# Patient Record
Sex: MALE | Race: WHITE | Employment: UNEMPLOYED | URBAN - METROPOLITAN AREA
[De-identification: names, ages, dates, MRNs, and addresses within clinical notes are randomized per-mention and may not be internally consistent; named-entity substitution may affect disease eponyms.]

---

## 2020-01-01 ENCOUNTER — APPOINTMENT (OUTPATIENT)
Dept: GENERAL RADIOLOGY | Age: 60
DRG: 720 | End: 2020-01-01
Attending: NURSE PRACTITIONER
Payer: MEDICAID

## 2020-01-01 ENCOUNTER — APPOINTMENT (OUTPATIENT)
Dept: GENERAL RADIOLOGY | Age: 60
DRG: 720 | End: 2020-01-01
Attending: INTERNAL MEDICINE
Payer: MEDICAID

## 2020-01-01 ENCOUNTER — HOSPITAL ENCOUNTER (INPATIENT)
Age: 60
LOS: 10 days | Discharge: HOSPICE | DRG: 720 | End: 2020-09-09
Attending: EMERGENCY MEDICINE | Admitting: INTERNAL MEDICINE
Payer: MEDICAID

## 2020-01-01 ENCOUNTER — HOSPITAL ENCOUNTER (INPATIENT)
Age: 60
LOS: 1 days | DRG: 951 | End: 2020-09-10
Attending: FAMILY MEDICINE | Admitting: FAMILY MEDICINE
Payer: OTHER MISCELLANEOUS

## 2020-01-01 ENCOUNTER — APPOINTMENT (OUTPATIENT)
Dept: GENERAL RADIOLOGY | Age: 60
DRG: 720 | End: 2020-01-01
Attending: EMERGENCY MEDICINE
Payer: MEDICAID

## 2020-01-01 ENCOUNTER — HOSPICE ADMISSION (OUTPATIENT)
Dept: HOSPICE | Facility: HOSPICE | Age: 60
End: 2020-01-01
Payer: MEDICAID

## 2020-01-01 ENCOUNTER — APPOINTMENT (OUTPATIENT)
Dept: NON INVASIVE DIAGNOSTICS | Age: 60
DRG: 720 | End: 2020-01-01
Attending: INTERNAL MEDICINE
Payer: MEDICAID

## 2020-01-01 VITALS
TEMPERATURE: 32 F | OXYGEN SATURATION: 98 % | HEIGHT: 67 IN | DIASTOLIC BLOOD PRESSURE: 70 MMHG | WEIGHT: 173.06 LBS | HEART RATE: 97 BPM | RESPIRATION RATE: 22 BRPM | BODY MASS INDEX: 27.16 KG/M2 | SYSTOLIC BLOOD PRESSURE: 105 MMHG

## 2020-01-01 DIAGNOSIS — R52 GENERALIZED PAIN: ICD-10-CM

## 2020-01-01 DIAGNOSIS — R41.89 UNRESPONSIVE EPISODE: Primary | ICD-10-CM

## 2020-01-01 DIAGNOSIS — R45.1 RESTLESSNESS: ICD-10-CM

## 2020-01-01 DIAGNOSIS — Z51.5 HOSPICE CARE: ICD-10-CM

## 2020-01-01 DIAGNOSIS — R41.89 UNRESPONSIVENESS: ICD-10-CM

## 2020-01-01 DIAGNOSIS — R06.82 TACHYPNEA: ICD-10-CM

## 2020-01-01 DIAGNOSIS — I95.9 HYPOTENSION, UNSPECIFIED HYPOTENSION TYPE: ICD-10-CM

## 2020-01-01 DIAGNOSIS — N17.9 ACUTE RENAL FAILURE, UNSPECIFIED ACUTE RENAL FAILURE TYPE (HCC): ICD-10-CM

## 2020-01-01 DIAGNOSIS — R06.4 LABORED BREATHING: ICD-10-CM

## 2020-01-01 LAB
ALBUMIN SERPL-MCNC: 1.5 G/DL (ref 3.5–5)
ALBUMIN SERPL-MCNC: 1.6 G/DL (ref 3.5–5)
ALBUMIN SERPL-MCNC: 2.1 G/DL (ref 3.5–5)
ALBUMIN SERPL-MCNC: 2.1 G/DL (ref 3.5–5)
ALBUMIN SERPL-MCNC: 2.6 G/DL (ref 3.5–5)
ALBUMIN/GLOB SERPL: 0.4 {RATIO} (ref 1.1–2.2)
ALBUMIN/GLOB SERPL: 0.6 {RATIO} (ref 1.1–2.2)
ALP SERPL-CCNC: 56 U/L (ref 45–117)
ALP SERPL-CCNC: 57 U/L (ref 45–117)
ALP SERPL-CCNC: 59 U/L (ref 45–117)
ALP SERPL-CCNC: 70 U/L (ref 45–117)
ALT SERPL-CCNC: 25 U/L (ref 12–78)
ALT SERPL-CCNC: 27 U/L (ref 12–78)
ALT SERPL-CCNC: 28 U/L (ref 12–78)
ALT SERPL-CCNC: 44 U/L (ref 12–78)
ALT SERPL-CCNC: 58 U/L (ref 12–78)
ALT SERPL-CCNC: 64 U/L (ref 12–78)
ALT SERPL-CCNC: 74 U/L (ref 12–78)
ANION GAP SERPL CALC-SCNC: 3 MMOL/L (ref 5–15)
ANION GAP SERPL CALC-SCNC: 4 MMOL/L (ref 5–15)
ANION GAP SERPL CALC-SCNC: 5 MMOL/L (ref 5–15)
ANION GAP SERPL CALC-SCNC: 5 MMOL/L (ref 5–15)
ANION GAP SERPL CALC-SCNC: 6 MMOL/L (ref 5–15)
ANION GAP SERPL CALC-SCNC: 8 MMOL/L (ref 5–15)
ANION GAP SERPL CALC-SCNC: 9 MMOL/L (ref 5–15)
APPEARANCE UR: CLEAR
ARTERIAL PATENCY WRIST A: ABNORMAL
ARTERIAL PATENCY WRIST A: YES
AST SERPL-CCNC: 30 U/L (ref 15–37)
AST SERPL-CCNC: 46 U/L (ref 15–37)
AST SERPL-CCNC: 70 U/L (ref 15–37)
AST SERPL-CCNC: 85 U/L (ref 15–37)
AST SERPL-CCNC: 90 U/L (ref 15–37)
ATRIAL RATE: 108 BPM
ATRIAL RATE: 139 BPM
ATRIAL RATE: 96 BPM
BACTERIA SPEC CULT: ABNORMAL
BACTERIA SPEC CULT: ABNORMAL
BACTERIA SPEC CULT: NORMAL
BACTERIA SPEC CULT: NORMAL
BACTERIA URNS QL MICRO: NEGATIVE /HPF
BASE DEFICIT BLD-SCNC: 3 MMOL/L
BASE DEFICIT BLD-SCNC: 7 MMOL/L
BASE EXCESS BLD CALC-SCNC: 2 MMOL/L
BASE EXCESS BLD CALC-SCNC: 3 MMOL/L
BASE EXCESS BLD CALC-SCNC: 4 MMOL/L
BASOPHILS # BLD: 0 K/UL (ref 0–0.1)
BASOPHILS # BLD: 0.1 K/UL (ref 0–0.1)
BASOPHILS NFR BLD: 0 % (ref 0–1)
BDY SITE: ABNORMAL
BILIRUB SERPL-MCNC: 0.4 MG/DL (ref 0.2–1)
BILIRUB SERPL-MCNC: 0.5 MG/DL (ref 0.2–1)
BILIRUB SERPL-MCNC: 0.7 MG/DL (ref 0.2–1)
BILIRUB SERPL-MCNC: 0.8 MG/DL (ref 0.2–1)
BILIRUB UR QL: NEGATIVE
BNP SERPL-MCNC: 1567 PG/ML
BNP SERPL-MCNC: 1705 PG/ML
BNP SERPL-MCNC: 1716 PG/ML
BNP SERPL-MCNC: 224 PG/ML
BNP SERPL-MCNC: 281 PG/ML
BNP SERPL-MCNC: 563 PG/ML
BNP SERPL-MCNC: 613 PG/ML
BNP SERPL-MCNC: 917 PG/ML
BUN SERPL-MCNC: 10 MG/DL (ref 6–20)
BUN SERPL-MCNC: 11 MG/DL (ref 6–20)
BUN SERPL-MCNC: 11 MG/DL (ref 6–20)
BUN SERPL-MCNC: 13 MG/DL (ref 6–20)
BUN SERPL-MCNC: 14 MG/DL (ref 6–20)
BUN SERPL-MCNC: 19 MG/DL (ref 6–20)
BUN SERPL-MCNC: 25 MG/DL (ref 6–20)
BUN SERPL-MCNC: 78 MG/DL (ref 6–20)
BUN SERPL-MCNC: 91 MG/DL (ref 6–20)
BUN/CREAT SERPL: 15 (ref 12–20)
BUN/CREAT SERPL: 18 (ref 12–20)
BUN/CREAT SERPL: 19 (ref 12–20)
BUN/CREAT SERPL: 20 (ref 12–20)
BUN/CREAT SERPL: 24 (ref 12–20)
BUN/CREAT SERPL: 28 (ref 12–20)
BUN/CREAT SERPL: 31 (ref 12–20)
CA-I BLD-SCNC: 1.12 MMOL/L (ref 1.12–1.32)
CA-I BLD-SCNC: 1.13 MMOL/L (ref 1.12–1.32)
CA-I BLD-SCNC: 1.13 MMOL/L (ref 1.12–1.32)
CA-I BLD-SCNC: 1.18 MMOL/L (ref 1.12–1.32)
CA-I BLD-SCNC: 1.19 MMOL/L (ref 1.12–1.32)
CA-I BLD-SCNC: 1.23 MMOL/L (ref 1.12–1.32)
CA-I BLD-SCNC: 1.24 MMOL/L (ref 1.12–1.32)
CALCIUM SERPL-MCNC: 7.6 MG/DL (ref 8.5–10.1)
CALCIUM SERPL-MCNC: 7.7 MG/DL (ref 8.5–10.1)
CALCIUM SERPL-MCNC: 7.7 MG/DL (ref 8.5–10.1)
CALCIUM SERPL-MCNC: 7.9 MG/DL (ref 8.5–10.1)
CALCIUM SERPL-MCNC: 8 MG/DL (ref 8.5–10.1)
CALCIUM SERPL-MCNC: 9.4 MG/DL (ref 8.5–10.1)
CALCULATED P AXIS, ECG09: 4 DEGREES
CALCULATED P AXIS, ECG09: 78 DEGREES
CALCULATED R AXIS, ECG10: -73 DEGREES
CALCULATED R AXIS, ECG10: 79 DEGREES
CALCULATED R AXIS, ECG10: 9 DEGREES
CALCULATED T AXIS, ECG11: -121 DEGREES
CALCULATED T AXIS, ECG11: 52 DEGREES
CALCULATED T AXIS, ECG11: 60 DEGREES
CHLORIDE SERPL-SCNC: 105 MMOL/L (ref 97–108)
CHLORIDE SERPL-SCNC: 107 MMOL/L (ref 97–108)
CHLORIDE SERPL-SCNC: 108 MMOL/L (ref 97–108)
CHLORIDE SERPL-SCNC: 111 MMOL/L (ref 97–108)
CHLORIDE SERPL-SCNC: 115 MMOL/L (ref 97–108)
CHLORIDE SERPL-SCNC: 117 MMOL/L (ref 97–108)
CHLORIDE SERPL-SCNC: 117 MMOL/L (ref 97–108)
CHLORIDE SERPL-SCNC: 119 MMOL/L (ref 97–108)
CHLORIDE SERPL-SCNC: 121 MMOL/L (ref 97–108)
CHLORIDE SERPL-SCNC: 122 MMOL/L (ref 97–108)
CHLORIDE SERPL-SCNC: 122 MMOL/L (ref 97–108)
CO2 SERPL-SCNC: 21 MMOL/L (ref 21–32)
CO2 SERPL-SCNC: 24 MMOL/L (ref 21–32)
CO2 SERPL-SCNC: 26 MMOL/L (ref 21–32)
CO2 SERPL-SCNC: 26 MMOL/L (ref 21–32)
CO2 SERPL-SCNC: 27 MMOL/L (ref 21–32)
CO2 SERPL-SCNC: 28 MMOL/L (ref 21–32)
CO2 SERPL-SCNC: 29 MMOL/L (ref 21–32)
CO2 SERPL-SCNC: 29 MMOL/L (ref 21–32)
CO2 SERPL-SCNC: 30 MMOL/L (ref 21–32)
COLOR UR: NORMAL
COMMENT, HOLDF: NORMAL
CREAT SERPL-MCNC: 0.65 MG/DL (ref 0.7–1.3)
CREAT SERPL-MCNC: 0.67 MG/DL (ref 0.7–1.3)
CREAT SERPL-MCNC: 0.68 MG/DL (ref 0.7–1.3)
CREAT SERPL-MCNC: 0.69 MG/DL (ref 0.7–1.3)
CREAT SERPL-MCNC: 0.7 MG/DL (ref 0.7–1.3)
CREAT SERPL-MCNC: 0.71 MG/DL (ref 0.7–1.3)
CREAT SERPL-MCNC: 0.71 MG/DL (ref 0.7–1.3)
CREAT SERPL-MCNC: 0.75 MG/DL (ref 0.7–1.3)
CREAT SERPL-MCNC: 0.81 MG/DL (ref 0.7–1.3)
CREAT SERPL-MCNC: 3.19 MG/DL (ref 0.7–1.3)
CREAT SERPL-MCNC: 4.98 MG/DL (ref 0.7–1.3)
DATE LAST DOSE: ABNORMAL
DIAGNOSIS, 93000: NORMAL
DIFFERENTIAL METHOD BLD: ABNORMAL
ECHO AV PEAK GRADIENT: 8.38 MMHG
ECHO AV PEAK VELOCITY: 144.76 CM/S
ECHO LA MAJOR AXIS: 3.43 CM
ECHO LA MINOR AXIS: 1.86 CM
ECHO LA TO AORTIC ROOT RATIO: 0.68
ECHO LV INTERNAL DIMENSION DIASTOLIC: 4.33 CM (ref 4.2–5.9)
ECHO LV INTERNAL DIMENSION SYSTOLIC: 3.3 CM
ECHO LV IVSD: 1.06 CM (ref 0.6–1)
ECHO LV MASS 2D: 138.1 G (ref 88–224)
ECHO LV MASS INDEX 2D: 74.7 G/M2 (ref 49–115)
ECHO LV POSTERIOR WALL DIASTOLIC: 0.88 CM (ref 0.6–1)
ECHO LVOT PEAK GRADIENT: 6.18 MMHG
ECHO LVOT PEAK VELOCITY: 124.26 CM/S
ECHO MV A VELOCITY: 84.4 CM/S
ECHO MV E VELOCITY: 85.68 CM/S
ECHO MV E/A RATIO: 1.02
ECHO RV TAPSE: 2.54 CM (ref 1.5–2)
ECHO TV REGURGITANT MAX VELOCITY: 282.83 CM/S
ECHO TV REGURGITANT PEAK GRADIENT: 32 MMHG
EOSINOPHIL # BLD: 0 K/UL (ref 0–0.4)
EOSINOPHIL # BLD: 0 K/UL (ref 0–0.4)
EOSINOPHIL # BLD: 0.2 K/UL (ref 0–0.4)
EOSINOPHIL NFR BLD: 0 % (ref 0–7)
EOSINOPHIL NFR BLD: 0 % (ref 0–7)
EOSINOPHIL NFR BLD: 1 % (ref 0–7)
EOSINOPHIL NFR BLD: 2 % (ref 0–7)
EOSINOPHIL NFR BLD: 2 % (ref 0–7)
EPITH CASTS URNS QL MICRO: NORMAL /LPF
ERYTHROCYTE [DISTWIDTH] IN BLOOD BY AUTOMATED COUNT: 14.6 % (ref 11.5–14.5)
ERYTHROCYTE [DISTWIDTH] IN BLOOD BY AUTOMATED COUNT: 14.7 % (ref 11.5–14.5)
ERYTHROCYTE [DISTWIDTH] IN BLOOD BY AUTOMATED COUNT: 14.9 % (ref 11.5–14.5)
ERYTHROCYTE [DISTWIDTH] IN BLOOD BY AUTOMATED COUNT: 15 % (ref 11.5–14.5)
ERYTHROCYTE [DISTWIDTH] IN BLOOD BY AUTOMATED COUNT: 15.1 % (ref 11.5–14.5)
ERYTHROCYTE [DISTWIDTH] IN BLOOD BY AUTOMATED COUNT: 15.3 % (ref 11.5–14.5)
ERYTHROCYTE [DISTWIDTH] IN BLOOD BY AUTOMATED COUNT: 15.4 % (ref 11.5–14.5)
ERYTHROCYTE [DISTWIDTH] IN BLOOD BY AUTOMATED COUNT: 15.5 % (ref 11.5–14.5)
ERYTHROCYTE [DISTWIDTH] IN BLOOD BY AUTOMATED COUNT: 15.8 % (ref 11.5–14.5)
GAS FLOW.O2 O2 DELIVERY SYS: ABNORMAL L/MIN
GAS FLOW.O2 SETTING OXYMISER: 14 BPM
GAS FLOW.O2 SETTING OXYMISER: 16 BPM
GAS FLOW.O2 SETTING OXYMISER: 16 BPM
GAS FLOW.O2 SETTING OXYMISER: 18 BPM
GAS FLOW.O2 SETTING OXYMISER: 20 BPM
GLOBULIN SER CALC-MCNC: 3.6 G/DL (ref 2–4)
GLOBULIN SER CALC-MCNC: 3.6 G/DL (ref 2–4)
GLOBULIN SER CALC-MCNC: 3.7 G/DL (ref 2–4)
GLOBULIN SER CALC-MCNC: 3.8 G/DL (ref 2–4)
GLOBULIN SER CALC-MCNC: 4 G/DL (ref 2–4)
GLOBULIN SER CALC-MCNC: 4.2 G/DL (ref 2–4)
GLOBULIN SER CALC-MCNC: 4.7 G/DL (ref 2–4)
GLUCOSE BLD STRIP.AUTO-MCNC: 100 MG/DL (ref 65–100)
GLUCOSE BLD STRIP.AUTO-MCNC: 100 MG/DL (ref 65–100)
GLUCOSE BLD STRIP.AUTO-MCNC: 101 MG/DL (ref 65–100)
GLUCOSE BLD STRIP.AUTO-MCNC: 102 MG/DL (ref 65–100)
GLUCOSE BLD STRIP.AUTO-MCNC: 102 MG/DL (ref 65–100)
GLUCOSE BLD STRIP.AUTO-MCNC: 104 MG/DL (ref 65–100)
GLUCOSE BLD STRIP.AUTO-MCNC: 105 MG/DL (ref 65–100)
GLUCOSE BLD STRIP.AUTO-MCNC: 106 MG/DL (ref 65–100)
GLUCOSE BLD STRIP.AUTO-MCNC: 108 MG/DL (ref 65–100)
GLUCOSE BLD STRIP.AUTO-MCNC: 110 MG/DL (ref 65–100)
GLUCOSE BLD STRIP.AUTO-MCNC: 112 MG/DL (ref 65–100)
GLUCOSE BLD STRIP.AUTO-MCNC: 116 MG/DL (ref 65–100)
GLUCOSE BLD STRIP.AUTO-MCNC: 135 MG/DL (ref 65–100)
GLUCOSE BLD STRIP.AUTO-MCNC: 141 MG/DL (ref 65–100)
GLUCOSE BLD STRIP.AUTO-MCNC: 77 MG/DL (ref 65–100)
GLUCOSE BLD STRIP.AUTO-MCNC: 79 MG/DL (ref 65–100)
GLUCOSE BLD STRIP.AUTO-MCNC: 83 MG/DL (ref 65–100)
GLUCOSE BLD STRIP.AUTO-MCNC: 85 MG/DL (ref 65–100)
GLUCOSE BLD STRIP.AUTO-MCNC: 86 MG/DL (ref 65–100)
GLUCOSE BLD STRIP.AUTO-MCNC: 88 MG/DL (ref 65–100)
GLUCOSE BLD STRIP.AUTO-MCNC: 88 MG/DL (ref 65–100)
GLUCOSE BLD STRIP.AUTO-MCNC: 90 MG/DL (ref 65–100)
GLUCOSE BLD STRIP.AUTO-MCNC: 93 MG/DL (ref 65–100)
GLUCOSE BLD STRIP.AUTO-MCNC: 94 MG/DL (ref 65–100)
GLUCOSE BLD STRIP.AUTO-MCNC: 94 MG/DL (ref 65–100)
GLUCOSE BLD STRIP.AUTO-MCNC: 95 MG/DL (ref 65–100)
GLUCOSE BLD STRIP.AUTO-MCNC: 96 MG/DL (ref 65–100)
GLUCOSE BLD STRIP.AUTO-MCNC: 97 MG/DL (ref 65–100)
GLUCOSE BLD STRIP.AUTO-MCNC: 99 MG/DL (ref 65–100)
GLUCOSE SERPL-MCNC: 100 MG/DL (ref 65–100)
GLUCOSE SERPL-MCNC: 104 MG/DL (ref 65–100)
GLUCOSE SERPL-MCNC: 105 MG/DL (ref 65–100)
GLUCOSE SERPL-MCNC: 106 MG/DL (ref 65–100)
GLUCOSE SERPL-MCNC: 108 MG/DL (ref 65–100)
GLUCOSE SERPL-MCNC: 109 MG/DL (ref 65–100)
GLUCOSE SERPL-MCNC: 116 MG/DL (ref 65–100)
GLUCOSE SERPL-MCNC: 117 MG/DL (ref 65–100)
GLUCOSE SERPL-MCNC: 147 MG/DL (ref 65–100)
GLUCOSE SERPL-MCNC: 83 MG/DL (ref 65–100)
GLUCOSE SERPL-MCNC: 87 MG/DL (ref 65–100)
GLUCOSE UR STRIP.AUTO-MCNC: NEGATIVE MG/DL
GRAM STN SPEC: ABNORMAL
HCO3 BLD-SCNC: 19.3 MMOL/L (ref 22–26)
HCO3 BLD-SCNC: 22.6 MMOL/L (ref 22–26)
HCO3 BLD-SCNC: 26.8 MMOL/L (ref 22–26)
HCO3 BLD-SCNC: 27.1 MMOL/L (ref 22–26)
HCO3 BLD-SCNC: 27.2 MMOL/L (ref 22–26)
HCO3 BLD-SCNC: 28 MMOL/L (ref 22–26)
HCO3 BLD-SCNC: 28.2 MMOL/L (ref 22–26)
HCT VFR BLD AUTO: 30.4 % (ref 36.6–50.3)
HCT VFR BLD AUTO: 32.3 % (ref 36.6–50.3)
HCT VFR BLD AUTO: 32.4 % (ref 36.6–50.3)
HCT VFR BLD AUTO: 33.5 % (ref 36.6–50.3)
HCT VFR BLD AUTO: 33.6 % (ref 36.6–50.3)
HCT VFR BLD AUTO: 33.7 % (ref 36.6–50.3)
HCT VFR BLD AUTO: 37.5 % (ref 36.6–50.3)
HCT VFR BLD AUTO: 40.7 % (ref 36.6–50.3)
HCT VFR BLD AUTO: 46.9 % (ref 36.6–50.3)
HEALTH STATUS, XMCV2T: NORMAL
HEALTH STATUS, XMCV2T: NORMAL
HGB BLD-MCNC: 10.3 G/DL (ref 12.1–17)
HGB BLD-MCNC: 10.7 G/DL (ref 12.1–17)
HGB BLD-MCNC: 10.8 G/DL (ref 12.1–17)
HGB BLD-MCNC: 10.8 G/DL (ref 12.1–17)
HGB BLD-MCNC: 11 G/DL (ref 12.1–17)
HGB BLD-MCNC: 12.2 G/DL (ref 12.1–17)
HGB BLD-MCNC: 13.5 G/DL (ref 12.1–17)
HGB BLD-MCNC: 15.3 G/DL (ref 12.1–17)
HGB BLD-MCNC: 9.8 G/DL (ref 12.1–17)
HGB UR QL STRIP: NEGATIVE
IMM GRANULOCYTES # BLD AUTO: 0 K/UL
IMM GRANULOCYTES # BLD AUTO: 0.1 K/UL (ref 0–0.04)
IMM GRANULOCYTES # BLD AUTO: 0.2 K/UL (ref 0–0.04)
IMM GRANULOCYTES # BLD AUTO: 0.2 K/UL (ref 0–0.04)
IMM GRANULOCYTES # BLD AUTO: 0.4 K/UL (ref 0–0.04)
IMM GRANULOCYTES NFR BLD AUTO: 0 %
IMM GRANULOCYTES NFR BLD AUTO: 1 % (ref 0–0.5)
IMM GRANULOCYTES NFR BLD AUTO: 2 % (ref 0–0.5)
INR PPP: 1 (ref 0.9–1.1)
INR PPP: 1.1 (ref 0.9–1.1)
INSPIRATION.DURATION SETTING TIME VENT: 1.72 SEC
KETONES UR QL STRIP.AUTO: NEGATIVE MG/DL
LACTATE SERPL-SCNC: 1.8 MMOL/L (ref 0.4–2)
LEUKOCYTE ESTERASE UR QL STRIP.AUTO: NEGATIVE
LYMPHOCYTES # BLD: 1 K/UL (ref 0.8–3.5)
LYMPHOCYTES # BLD: 1 K/UL (ref 0.8–3.5)
LYMPHOCYTES # BLD: 1.2 K/UL (ref 0.8–3.5)
LYMPHOCYTES # BLD: 1.3 K/UL (ref 0.8–3.5)
LYMPHOCYTES # BLD: 1.4 K/UL (ref 0.8–3.5)
LYMPHOCYTES # BLD: 1.4 K/UL (ref 0.8–3.5)
LYMPHOCYTES # BLD: 1.8 K/UL (ref 0.8–3.5)
LYMPHOCYTES NFR BLD: 13 % (ref 12–49)
LYMPHOCYTES NFR BLD: 18 % (ref 12–49)
LYMPHOCYTES NFR BLD: 5 % (ref 12–49)
LYMPHOCYTES NFR BLD: 7 % (ref 12–49)
LYMPHOCYTES NFR BLD: 9 % (ref 12–49)
MAGNESIUM SERPL-MCNC: 1.9 MG/DL (ref 1.6–2.4)
MAGNESIUM SERPL-MCNC: 2 MG/DL (ref 1.6–2.4)
MAGNESIUM SERPL-MCNC: 2 MG/DL (ref 1.6–2.4)
MAGNESIUM SERPL-MCNC: 2.1 MG/DL (ref 1.6–2.4)
MAGNESIUM SERPL-MCNC: 2.1 MG/DL (ref 1.6–2.4)
MAGNESIUM SERPL-MCNC: 2.2 MG/DL (ref 1.6–2.4)
MAGNESIUM SERPL-MCNC: 2.2 MG/DL (ref 1.6–2.4)
MAGNESIUM SERPL-MCNC: 2.6 MG/DL (ref 1.6–2.4)
MCH RBC QN AUTO: 29.9 PG (ref 26–34)
MCH RBC QN AUTO: 30.1 PG (ref 26–34)
MCH RBC QN AUTO: 30.4 PG (ref 26–34)
MCH RBC QN AUTO: 30.5 PG (ref 26–34)
MCH RBC QN AUTO: 30.6 PG (ref 26–34)
MCH RBC QN AUTO: 30.7 PG (ref 26–34)
MCH RBC QN AUTO: 30.8 PG (ref 26–34)
MCH RBC QN AUTO: 30.9 PG (ref 26–34)
MCH RBC QN AUTO: 31.1 PG (ref 26–34)
MCHC RBC AUTO-ENTMCNC: 31.9 G/DL (ref 30–36.5)
MCHC RBC AUTO-ENTMCNC: 31.9 G/DL (ref 30–36.5)
MCHC RBC AUTO-ENTMCNC: 32.1 G/DL (ref 30–36.5)
MCHC RBC AUTO-ENTMCNC: 32.2 G/DL (ref 30–36.5)
MCHC RBC AUTO-ENTMCNC: 32.5 G/DL (ref 30–36.5)
MCHC RBC AUTO-ENTMCNC: 32.6 G/DL (ref 30–36.5)
MCHC RBC AUTO-ENTMCNC: 32.6 G/DL (ref 30–36.5)
MCHC RBC AUTO-ENTMCNC: 33.2 G/DL (ref 30–36.5)
MCHC RBC AUTO-ENTMCNC: 33.3 G/DL (ref 30–36.5)
MCV RBC AUTO: 91.5 FL (ref 80–99)
MCV RBC AUTO: 92.3 FL (ref 80–99)
MCV RBC AUTO: 92.7 FL (ref 80–99)
MCV RBC AUTO: 93.1 FL (ref 80–99)
MCV RBC AUTO: 93.9 FL (ref 80–99)
MCV RBC AUTO: 95 FL (ref 80–99)
MCV RBC AUTO: 95.7 FL (ref 80–99)
MCV RBC AUTO: 96 FL (ref 80–99)
MCV RBC AUTO: 96 FL (ref 80–99)
MONOCYTES # BLD: 0.8 K/UL (ref 0–1)
MONOCYTES # BLD: 0.8 K/UL (ref 0–1)
MONOCYTES # BLD: 0.9 K/UL (ref 0–1)
MONOCYTES # BLD: 1.1 K/UL (ref 0–1)
MONOCYTES # BLD: 1.5 K/UL (ref 0–1)
MONOCYTES # BLD: 1.6 K/UL (ref 0–1)
MONOCYTES # BLD: 2.1 K/UL (ref 0–1)
MONOCYTES NFR BLD: 5 % (ref 5–13)
MONOCYTES NFR BLD: 6 % (ref 5–13)
MONOCYTES NFR BLD: 7 % (ref 5–13)
MONOCYTES NFR BLD: 7 % (ref 5–13)
MONOCYTES NFR BLD: 8 % (ref 5–13)
MONOCYTES NFR BLD: 8 % (ref 5–13)
MONOCYTES NFR BLD: 9 % (ref 5–13)
NEUTS BAND NFR BLD MANUAL: 2 % (ref 0–6)
NEUTS SEG # BLD: 13.1 K/UL (ref 1.8–8)
NEUTS SEG # BLD: 17.7 K/UL (ref 1.8–8)
NEUTS SEG # BLD: 18.8 K/UL (ref 1.8–8)
NEUTS SEG # BLD: 19.2 K/UL (ref 1.8–8)
NEUTS SEG # BLD: 19.8 K/UL (ref 1.8–8)
NEUTS SEG # BLD: 6.8 K/UL (ref 1.8–8)
NEUTS SEG # BLD: 7.8 K/UL (ref 1.8–8)
NEUTS SEG NFR BLD: 70 % (ref 32–75)
NEUTS SEG NFR BLD: 76 % (ref 32–75)
NEUTS SEG NFR BLD: 82 % (ref 32–75)
NEUTS SEG NFR BLD: 83 % (ref 32–75)
NEUTS SEG NFR BLD: 85 % (ref 32–75)
NEUTS SEG NFR BLD: 87 % (ref 32–75)
NEUTS SEG NFR BLD: 87 % (ref 32–75)
NITRITE UR QL STRIP.AUTO: NEGATIVE
NRBC # BLD: 0 K/UL (ref 0–0.01)
NRBC BLD-RTO: 0 PER 100 WBC
O2/TOTAL GAS SETTING VFR VENT: 100 %
O2/TOTAL GAS SETTING VFR VENT: 24 %
O2/TOTAL GAS SETTING VFR VENT: 30 %
O2/TOTAL GAS SETTING VFR VENT: 50 %
O2/TOTAL GAS SETTING VFR VENT: 70 %
P-R INTERVAL, ECG05: 126 MS
P-R INTERVAL, ECG05: 142 MS
P-R INTERVAL, ECG05: 148 MS
PCO2 BLD: 37.5 MMHG (ref 35–45)
PCO2 BLD: 39.2 MMHG (ref 35–45)
PCO2 BLD: 39.5 MMHG (ref 35–45)
PCO2 BLD: 42.9 MMHG (ref 35–45)
PCO2 BLD: 44.6 MMHG (ref 35–45)
PCO2 BLD: 44.7 MMHG (ref 35–45)
PCO2 BLD: 46.2 MMHG (ref 35–45)
PEEP RESPIRATORY: 10 CMH2O
PEEP RESPIRATORY: 10 CMH2O
PEEP RESPIRATORY: 5 CMH2O
PEEP RESPIRATORY: 8 CMH2O
PH BLD: 7.32 [PH] (ref 7.35–7.45)
PH BLD: 7.33 [PH] (ref 7.35–7.45)
PH BLD: 7.38 [PH] (ref 7.35–7.45)
PH BLD: 7.41 [PH] (ref 7.35–7.45)
PH BLD: 7.41 [PH] (ref 7.35–7.45)
PH BLD: 7.44 [PH] (ref 7.35–7.45)
PH BLD: 7.45 [PH] (ref 7.35–7.45)
PH UR STRIP: 5 [PH] (ref 5–8)
PHOSPHATE SERPL-MCNC: 0.7 MG/DL (ref 2.6–4.7)
PHOSPHATE SERPL-MCNC: 1.2 MG/DL (ref 2.6–4.7)
PHOSPHATE SERPL-MCNC: 1.6 MG/DL (ref 2.6–4.7)
PHOSPHATE SERPL-MCNC: 1.8 MG/DL (ref 2.6–4.7)
PHOSPHATE SERPL-MCNC: 2.4 MG/DL (ref 2.6–4.7)
PHOSPHATE SERPL-MCNC: 2.4 MG/DL (ref 2.6–4.7)
PHOSPHATE SERPL-MCNC: 2.9 MG/DL (ref 2.6–4.7)
PHOSPHATE SERPL-MCNC: 3.6 MG/DL (ref 2.6–4.7)
PIP ISTAT,IPIP: 19
PLATELET # BLD AUTO: 203 K/UL (ref 150–400)
PLATELET # BLD AUTO: 218 K/UL (ref 150–400)
PLATELET # BLD AUTO: 220 K/UL (ref 150–400)
PLATELET # BLD AUTO: 229 K/UL (ref 150–400)
PLATELET # BLD AUTO: 247 K/UL (ref 150–400)
PLATELET # BLD AUTO: 247 K/UL (ref 150–400)
PLATELET # BLD AUTO: 255 K/UL (ref 150–400)
PLATELET # BLD AUTO: 335 K/UL (ref 150–400)
PLATELET # BLD AUTO: 335 K/UL (ref 150–400)
PMV BLD AUTO: 10.4 FL (ref 8.9–12.9)
PMV BLD AUTO: 10.6 FL (ref 8.9–12.9)
PMV BLD AUTO: 10.9 FL (ref 8.9–12.9)
PMV BLD AUTO: 10.9 FL (ref 8.9–12.9)
PMV BLD AUTO: 9.8 FL (ref 8.9–12.9)
PMV BLD AUTO: 9.9 FL (ref 8.9–12.9)
PO2 BLD: 184 MMHG (ref 80–100)
PO2 BLD: 194 MMHG (ref 80–100)
PO2 BLD: 208 MMHG (ref 80–100)
PO2 BLD: 72 MMHG (ref 80–100)
PO2 BLD: 83 MMHG (ref 80–100)
PO2 BLD: 84 MMHG (ref 80–100)
PO2 BLD: 94 MMHG (ref 80–100)
POTASSIUM SERPL-SCNC: 2.9 MMOL/L (ref 3.5–5.1)
POTASSIUM SERPL-SCNC: 3 MMOL/L (ref 3.5–5.1)
POTASSIUM SERPL-SCNC: 3.1 MMOL/L (ref 3.5–5.1)
POTASSIUM SERPL-SCNC: 3.4 MMOL/L (ref 3.5–5.1)
POTASSIUM SERPL-SCNC: 3.4 MMOL/L (ref 3.5–5.1)
POTASSIUM SERPL-SCNC: 3.5 MMOL/L (ref 3.5–5.1)
POTASSIUM SERPL-SCNC: 3.7 MMOL/L (ref 3.5–5.1)
POTASSIUM SERPL-SCNC: 3.8 MMOL/L (ref 3.5–5.1)
POTASSIUM SERPL-SCNC: 4.1 MMOL/L (ref 3.5–5.1)
POTASSIUM SERPL-SCNC: 4.1 MMOL/L (ref 3.5–5.1)
POTASSIUM SERPL-SCNC: 5.5 MMOL/L (ref 3.5–5.1)
PROT SERPL-MCNC: 5.2 G/DL (ref 6.4–8.2)
PROT SERPL-MCNC: 5.3 G/DL (ref 6.4–8.2)
PROT SERPL-MCNC: 5.6 G/DL (ref 6.4–8.2)
PROT SERPL-MCNC: 5.7 G/DL (ref 6.4–8.2)
PROT SERPL-MCNC: 5.8 G/DL (ref 6.4–8.2)
PROT SERPL-MCNC: 5.9 G/DL (ref 6.4–8.2)
PROT SERPL-MCNC: 7.3 G/DL (ref 6.4–8.2)
PROT UR STRIP-MCNC: NEGATIVE MG/DL
PROTHROMBIN TIME: 10 SEC (ref 9–11.1)
PROTHROMBIN TIME: 10.2 SEC (ref 9–11.1)
PROTHROMBIN TIME: 10.2 SEC (ref 9–11.1)
PROTHROMBIN TIME: 10.3 SEC (ref 9–11.1)
PROTHROMBIN TIME: 10.3 SEC (ref 9–11.1)
PROTHROMBIN TIME: 10.4 SEC (ref 9–11.1)
PROTHROMBIN TIME: 10.4 SEC (ref 9–11.1)
PROTHROMBIN TIME: 11.2 SEC (ref 9–11.1)
Q-T INTERVAL, ECG07: 282 MS
Q-T INTERVAL, ECG07: 322 MS
Q-T INTERVAL, ECG07: 350 MS
QRS DURATION, ECG06: 76 MS
QRS DURATION, ECG06: 82 MS
QRS DURATION, ECG06: 86 MS
QTC CALCULATION (BEZET), ECG08: 429 MS
QTC CALCULATION (BEZET), ECG08: 431 MS
QTC CALCULATION (BEZET), ECG08: 442 MS
RBC # BLD AUTO: 3.2 M/UL (ref 4.1–5.7)
RBC # BLD AUTO: 3.44 M/UL (ref 4.1–5.7)
RBC # BLD AUTO: 3.49 M/UL (ref 4.1–5.7)
RBC # BLD AUTO: 3.5 M/UL (ref 4.1–5.7)
RBC # BLD AUTO: 3.54 M/UL (ref 4.1–5.7)
RBC # BLD AUTO: 3.65 M/UL (ref 4.1–5.7)
RBC # BLD AUTO: 3.92 M/UL (ref 4.1–5.7)
RBC # BLD AUTO: 4.39 M/UL (ref 4.1–5.7)
RBC # BLD AUTO: 5.04 M/UL (ref 4.1–5.7)
RBC #/AREA URNS HPF: NORMAL /HPF (ref 0–5)
RBC MORPH BLD: ABNORMAL
REPORTED DOSE,DOSE: ABNORMAL UNITS
REPORTED DOSE/TIME,TMG: ABNORMAL
SAMPLES BEING HELD,HOLD: NORMAL
SAO2 % BLD: 100 % (ref 92–97)
SAO2 % BLD: 94 % (ref 92–97)
SAO2 % BLD: 97 % (ref 92–97)
SARS-COV-2, COV2: NOT DETECTED
SARS-COV-2, COV2: NOT DETECTED
SERVICE CMNT-IMP: ABNORMAL
SERVICE CMNT-IMP: NORMAL
SODIUM SERPL-SCNC: 137 MMOL/L (ref 136–145)
SODIUM SERPL-SCNC: 140 MMOL/L (ref 136–145)
SODIUM SERPL-SCNC: 140 MMOL/L (ref 136–145)
SODIUM SERPL-SCNC: 144 MMOL/L (ref 136–145)
SODIUM SERPL-SCNC: 146 MMOL/L (ref 136–145)
SODIUM SERPL-SCNC: 147 MMOL/L (ref 136–145)
SODIUM SERPL-SCNC: 149 MMOL/L (ref 136–145)
SODIUM SERPL-SCNC: 152 MMOL/L (ref 136–145)
SODIUM SERPL-SCNC: 152 MMOL/L (ref 136–145)
SODIUM SERPL-SCNC: 153 MMOL/L (ref 136–145)
SODIUM SERPL-SCNC: 154 MMOL/L (ref 136–145)
SOURCE, COVRS: NORMAL
SOURCE, COVRS: NORMAL
SP GR UR REFRACTOMETRY: 1.02 (ref 1–1.03)
SPECIMEN SOURCE, FCOV2M: NORMAL
SPECIMEN SOURCE, FCOV2M: NORMAL
SPECIMEN TYPE, XMCV1T: NORMAL
SPECIMEN TYPE, XMCV1T: NORMAL
SPECIMEN TYPE: ABNORMAL
TOTAL RESP. RATE, ITRR: 14
TOTAL RESP. RATE, ITRR: 15
TOTAL RESP. RATE, ITRR: 15
TOTAL RESP. RATE, ITRR: 18
TOTAL RESP. RATE, ITRR: 19
TOTAL RESP. RATE, ITRR: 20
TOTAL RESP. RATE, ITRR: 20
TROPONIN I SERPL-MCNC: <0.05 NG/ML
UR CULT HOLD, URHOLD: NORMAL
UROBILINOGEN UR QL STRIP.AUTO: 0.2 EU/DL (ref 0.2–1)
VANCOMYCIN SERPL-MCNC: 4.7 UG/ML
VANCOMYCIN TROUGH SERPL-MCNC: 14.3 UG/ML (ref 5–10)
VENTILATION MODE VENT: ABNORMAL
VENTRICULAR RATE, ECG03: 108 BPM
VENTRICULAR RATE, ECG03: 139 BPM
VENTRICULAR RATE, ECG03: 96 BPM
VT SETTING VENT: 480 ML
VT SETTING VENT: 550 ML
VT SETTING VENT: 550 ML
WBC # BLD AUTO: 10 K/UL (ref 4.1–11.1)
WBC # BLD AUTO: 10.2 K/UL (ref 4.1–11.1)
WBC # BLD AUTO: 11.1 K/UL (ref 4.1–11.1)
WBC # BLD AUTO: 15.9 K/UL (ref 4.1–11.1)
WBC # BLD AUTO: 20.7 K/UL (ref 4.1–11.1)
WBC # BLD AUTO: 21.6 K/UL (ref 4.1–11.1)
WBC # BLD AUTO: 21.8 K/UL (ref 4.1–11.1)
WBC # BLD AUTO: 23.3 K/UL (ref 4.1–11.1)
WBC # BLD AUTO: 9.8 K/UL (ref 4.1–11.1)
WBC URNS QL MICRO: NORMAL /HPF (ref 0–4)

## 2020-01-01 PROCEDURE — 82962 GLUCOSE BLOOD TEST: CPT

## 2020-01-01 PROCEDURE — 74011250636 HC RX REV CODE- 250/636

## 2020-01-01 PROCEDURE — 84100 ASSAY OF PHOSPHORUS: CPT

## 2020-01-01 PROCEDURE — 74018 RADEX ABDOMEN 1 VIEW: CPT

## 2020-01-01 PROCEDURE — 87635 SARS-COV-2 COVID-19 AMP PRB: CPT

## 2020-01-01 PROCEDURE — 94761 N-INVAS EAR/PLS OXIMETRY MLT: CPT

## 2020-01-01 PROCEDURE — 94003 VENT MGMT INPAT SUBQ DAY: CPT

## 2020-01-01 PROCEDURE — 83880 ASSAY OF NATRIURETIC PEPTIDE: CPT

## 2020-01-01 PROCEDURE — 83605 ASSAY OF LACTIC ACID: CPT

## 2020-01-01 PROCEDURE — 80053 COMPREHEN METABOLIC PANEL: CPT

## 2020-01-01 PROCEDURE — 85025 COMPLETE CBC W/AUTO DIFF WBC: CPT

## 2020-01-01 PROCEDURE — 96361 HYDRATE IV INFUSION ADD-ON: CPT

## 2020-01-01 PROCEDURE — 74011250637 HC RX REV CODE- 250/637: Performed by: NURSE PRACTITIONER

## 2020-01-01 PROCEDURE — 74011250636 HC RX REV CODE- 250/636: Performed by: FAMILY MEDICINE

## 2020-01-01 PROCEDURE — 74011250636 HC RX REV CODE- 250/636: Performed by: NURSE PRACTITIONER

## 2020-01-01 PROCEDURE — 36415 COLL VENOUS BLD VENIPUNCTURE: CPT

## 2020-01-01 PROCEDURE — 74011000250 HC RX REV CODE- 250

## 2020-01-01 PROCEDURE — 65270000029 HC RM PRIVATE

## 2020-01-01 PROCEDURE — 85610 PROTHROMBIN TIME: CPT

## 2020-01-01 PROCEDURE — 94002 VENT MGMT INPAT INIT DAY: CPT

## 2020-01-01 PROCEDURE — 74011250636 HC RX REV CODE- 250/636: Performed by: INTERNAL MEDICINE

## 2020-01-01 PROCEDURE — 83735 ASSAY OF MAGNESIUM: CPT

## 2020-01-01 PROCEDURE — 93005 ELECTROCARDIOGRAM TRACING: CPT

## 2020-01-01 PROCEDURE — 02HV33Z INSERTION OF INFUSION DEVICE INTO SUPERIOR VENA CAVA, PERCUTANEOUS APPROACH: ICD-10-PCS | Performed by: INTERNAL MEDICINE

## 2020-01-01 PROCEDURE — 82803 BLOOD GASES ANY COMBINATION: CPT

## 2020-01-01 PROCEDURE — 74011000258 HC RX REV CODE- 258: Performed by: INTERNAL MEDICINE

## 2020-01-01 PROCEDURE — 36600 WITHDRAWAL OF ARTERIAL BLOOD: CPT

## 2020-01-01 PROCEDURE — 94760 N-INVAS EAR/PLS OXIMETRY 1: CPT

## 2020-01-01 PROCEDURE — 31500 INSERT EMERGENCY AIRWAY: CPT

## 2020-01-01 PROCEDURE — 87040 BLOOD CULTURE FOR BACTERIA: CPT

## 2020-01-01 PROCEDURE — 99284 EMERGENCY DEPT VISIT MOD MDM: CPT

## 2020-01-01 PROCEDURE — 74011636637 HC RX REV CODE- 636/637: Performed by: NURSE PRACTITIONER

## 2020-01-01 PROCEDURE — 74011000250 HC RX REV CODE- 250: Performed by: NURSE PRACTITIONER

## 2020-01-01 PROCEDURE — 74011250636 HC RX REV CODE- 250/636: Performed by: EMERGENCY MEDICINE

## 2020-01-01 PROCEDURE — 74011000258 HC RX REV CODE- 258: Performed by: EMERGENCY MEDICINE

## 2020-01-01 PROCEDURE — 65610000006 HC RM INTENSIVE CARE

## 2020-01-01 PROCEDURE — 94762 N-INVAS EAR/PLS OXIMTRY CONT: CPT

## 2020-01-01 PROCEDURE — 74011000250 HC RX REV CODE- 250: Performed by: INTERNAL MEDICINE

## 2020-01-01 PROCEDURE — 71045 X-RAY EXAM CHEST 1 VIEW: CPT

## 2020-01-01 PROCEDURE — 87070 CULTURE OTHR SPECIMN AEROBIC: CPT

## 2020-01-01 PROCEDURE — 74011250636 HC RX REV CODE- 250/636: Performed by: HOSPITALIST

## 2020-01-01 PROCEDURE — 0656 HSPC GENERAL INPATIENT

## 2020-01-01 PROCEDURE — 93306 TTE W/DOPPLER COMPLETE: CPT

## 2020-01-01 PROCEDURE — 96365 THER/PROPH/DIAG IV INF INIT: CPT

## 2020-01-01 PROCEDURE — 36573 INSJ PICC RS&I 5 YR+: CPT | Performed by: NURSE PRACTITIONER

## 2020-01-01 PROCEDURE — 74011000250 HC RX REV CODE- 250: Performed by: EMERGENCY MEDICINE

## 2020-01-01 PROCEDURE — 85027 COMPLETE CBC AUTOMATED: CPT

## 2020-01-01 PROCEDURE — 81001 URINALYSIS AUTO W/SCOPE: CPT

## 2020-01-01 PROCEDURE — 04HY32Z INSERTION OF MONITORING DEVICE INTO LOWER ARTERY, PERCUTANEOUS APPROACH: ICD-10-PCS | Performed by: INTERNAL MEDICINE

## 2020-01-01 PROCEDURE — 77010033678 HC OXYGEN DAILY

## 2020-01-01 PROCEDURE — 99285 EMERGENCY DEPT VISIT HI MDM: CPT

## 2020-01-01 PROCEDURE — C1751 CATH, INF, PER/CENT/MIDLINE: HCPCS

## 2020-01-01 PROCEDURE — 77030005402 HC CATH RAD ART LN KT TELE -B

## 2020-01-01 PROCEDURE — 84484 ASSAY OF TROPONIN QUANT: CPT

## 2020-01-01 PROCEDURE — 74011250637 HC RX REV CODE- 250/637: Performed by: INTERNAL MEDICINE

## 2020-01-01 PROCEDURE — 80048 BASIC METABOLIC PNL TOTAL CA: CPT

## 2020-01-01 PROCEDURE — P9045 ALBUMIN (HUMAN), 5%, 250 ML: HCPCS | Performed by: NURSE PRACTITIONER

## 2020-01-01 PROCEDURE — 94640 AIRWAY INHALATION TREATMENT: CPT

## 2020-01-01 PROCEDURE — 77030038563 HC TU ART PRESSR ICUM -Z

## 2020-01-01 PROCEDURE — 77030041978 HC DVC TIP TRC VPS TELE -B

## 2020-01-01 PROCEDURE — 76937 US GUIDE VASCULAR ACCESS: CPT

## 2020-01-01 PROCEDURE — 94664 DEMO&/EVAL PT USE INHALER: CPT

## 2020-01-01 PROCEDURE — 0BH17EZ INSERTION OF ENDOTRACHEAL AIRWAY INTO TRACHEA, VIA NATURAL OR ARTIFICIAL OPENING: ICD-10-PCS | Performed by: EMERGENCY MEDICINE

## 2020-01-01 PROCEDURE — 82040 ASSAY OF SERUM ALBUMIN: CPT

## 2020-01-01 PROCEDURE — 80202 ASSAY OF VANCOMYCIN: CPT

## 2020-01-01 PROCEDURE — 77030005513 HC CATH URETH FOL11 MDII -B

## 2020-01-01 PROCEDURE — 74011000258 HC RX REV CODE- 258: Performed by: NURSE PRACTITIONER

## 2020-01-01 PROCEDURE — 65620000000 HC RM CCU GENERAL

## 2020-01-01 PROCEDURE — 3336500001 HSPC ELECTION

## 2020-01-01 PROCEDURE — 51702 INSERT TEMP BLADDER CATH: CPT

## 2020-01-01 PROCEDURE — 36592 COLLECT BLOOD FROM PICC: CPT

## 2020-01-01 PROCEDURE — 96368 THER/DIAG CONCURRENT INF: CPT

## 2020-01-01 PROCEDURE — 77030020365 HC SOL INJ SOD CL 0.9% 50ML

## 2020-01-01 PROCEDURE — 5A1955Z RESPIRATORY VENTILATION, GREATER THAN 96 CONSECUTIVE HOURS: ICD-10-PCS | Performed by: EMERGENCY MEDICINE

## 2020-01-01 PROCEDURE — 74011000250 HC RX REV CODE- 250: Performed by: HOSPITALIST

## 2020-01-01 RX ORDER — POTASSIUM CHLORIDE 29.8 MG/ML
20 INJECTION INTRAVENOUS
Status: COMPLETED | OUTPATIENT
Start: 2020-01-01 | End: 2020-01-01

## 2020-01-01 RX ORDER — POTASSIUM CHLORIDE 14.9 MG/ML
20 INJECTION INTRAVENOUS
Status: DISCONTINUED | OUTPATIENT
Start: 2020-01-01 | End: 2020-01-01 | Stop reason: SDUPTHER

## 2020-01-01 RX ORDER — FENTANYL CITRATE 50 UG/ML
INJECTION, SOLUTION INTRAMUSCULAR; INTRAVENOUS
Status: COMPLETED
Start: 2020-01-01 | End: 2020-01-01

## 2020-01-01 RX ORDER — ACETAMINOPHEN 325 MG/1
650 TABLET ORAL
COMMUNITY

## 2020-01-01 RX ORDER — ROCURONIUM BROMIDE 10 MG/ML
INJECTION, SOLUTION INTRAVENOUS
Status: DISPENSED
Start: 2020-01-01 | End: 2020-01-01

## 2020-01-01 RX ORDER — CODEINE PHOSPHATE AND GUAIFENESIN 10; 100 MG/5ML; MG/5ML
10 SOLUTION ORAL
COMMUNITY

## 2020-01-01 RX ORDER — ATORVASTATIN CALCIUM 40 MG/1
40 TABLET, FILM COATED ORAL
COMMUNITY

## 2020-01-01 RX ORDER — MAGNESIUM SULFATE 100 %
4 CRYSTALS MISCELLANEOUS AS NEEDED
Status: DISCONTINUED | OUTPATIENT
Start: 2020-01-01 | End: 2020-01-01

## 2020-01-01 RX ORDER — LANOLIN ALCOHOL/MO/W.PET/CERES
100 CREAM (GRAM) TOPICAL DAILY
Status: DISCONTINUED | OUTPATIENT
Start: 2020-01-01 | End: 2020-01-01

## 2020-01-01 RX ORDER — DICLOFENAC SODIUM 10 MG/G
2 GEL TOPICAL 2 TIMES DAILY
COMMUNITY

## 2020-01-01 RX ORDER — ONDANSETRON 2 MG/ML
4 INJECTION INTRAMUSCULAR; INTRAVENOUS
Status: DISCONTINUED | OUTPATIENT
Start: 2020-01-01 | End: 2020-01-01 | Stop reason: HOSPADM

## 2020-01-01 RX ORDER — AMLODIPINE BESYLATE 10 MG/1
10 TABLET ORAL DAILY
COMMUNITY

## 2020-01-01 RX ORDER — METOPROLOL TARTRATE 5 MG/5ML
2.5 INJECTION INTRAVENOUS ONCE
Status: COMPLETED | OUTPATIENT
Start: 2020-01-01 | End: 2020-01-01

## 2020-01-01 RX ORDER — POTASSIUM CHLORIDE 29.8 MG/ML
20 INJECTION INTRAVENOUS ONCE
Status: COMPLETED | OUTPATIENT
Start: 2020-01-01 | End: 2020-01-01

## 2020-01-01 RX ORDER — MIDAZOLAM HYDROCHLORIDE 1 MG/ML
2 INJECTION, SOLUTION INTRAMUSCULAR; INTRAVENOUS
Status: COMPLETED | OUTPATIENT
Start: 2020-01-01 | End: 2020-01-01

## 2020-01-01 RX ORDER — ETOMIDATE 2 MG/ML
30 INJECTION INTRAVENOUS
Status: COMPLETED | OUTPATIENT
Start: 2020-01-01 | End: 2020-01-01

## 2020-01-01 RX ORDER — VANCOMYCIN/0.9 % SOD CHLORIDE 1.5G/250ML
1500 PLASTIC BAG, INJECTION (ML) INTRAVENOUS ONCE
Status: COMPLETED | OUTPATIENT
Start: 2020-01-01 | End: 2020-01-01

## 2020-01-01 RX ORDER — ACETAMINOPHEN 650 MG/1
650 SUPPOSITORY RECTAL
Status: DISCONTINUED | OUTPATIENT
Start: 2020-01-01 | End: 2020-01-01 | Stop reason: HOSPADM

## 2020-01-01 RX ORDER — MORPHINE SULFATE 2 MG/ML
2 INJECTION, SOLUTION INTRAMUSCULAR; INTRAVENOUS
Status: DISCONTINUED | OUTPATIENT
Start: 2020-01-01 | End: 2020-01-01

## 2020-01-01 RX ORDER — BACITRACIN 500 UNIT/G
PACKET (EA) TOPICAL
Status: COMPLETED
Start: 2020-01-01 | End: 2020-01-01

## 2020-01-01 RX ORDER — VANCOMYCIN HYDROCHLORIDE
1250 ONCE
Status: COMPLETED | OUTPATIENT
Start: 2020-01-01 | End: 2020-01-01

## 2020-01-01 RX ORDER — KETOROLAC TROMETHAMINE 30 MG/ML
30 INJECTION, SOLUTION INTRAMUSCULAR; INTRAVENOUS
Status: DISCONTINUED | OUTPATIENT
Start: 2020-01-01 | End: 2020-01-01 | Stop reason: HOSPADM

## 2020-01-01 RX ORDER — LORAZEPAM 2 MG/ML
INJECTION INTRAMUSCULAR
Status: DISCONTINUED
Start: 2020-01-01 | End: 2020-01-01 | Stop reason: HOSPADM

## 2020-01-01 RX ORDER — SUCCINYLCHOLINE CHLORIDE 20 MG/ML
INJECTION INTRAMUSCULAR; INTRAVENOUS
Status: COMPLETED
Start: 2020-01-01 | End: 2020-01-01

## 2020-01-01 RX ORDER — TRAMADOL HYDROCHLORIDE 50 MG/1
50 TABLET ORAL
COMMUNITY

## 2020-01-01 RX ORDER — HEPARIN SODIUM 5000 [USP'U]/ML
5000 INJECTION, SOLUTION INTRAVENOUS; SUBCUTANEOUS EVERY 8 HOURS
Status: DISCONTINUED | OUTPATIENT
Start: 2020-01-01 | End: 2020-01-01

## 2020-01-01 RX ORDER — PROPOFOL 10 MG/ML
5 VIAL (ML) INTRAVENOUS
Status: COMPLETED | OUTPATIENT
Start: 2020-01-01 | End: 2020-01-01

## 2020-01-01 RX ORDER — METHOCARBAMOL 500 MG/1
1000 TABLET, FILM COATED ORAL EVERY 6 HOURS
COMMUNITY

## 2020-01-01 RX ORDER — HYDROMORPHONE HYDROCHLORIDE 2 MG/ML
INJECTION, SOLUTION INTRAMUSCULAR; INTRAVENOUS; SUBCUTANEOUS
Status: DISCONTINUED
Start: 2020-01-01 | End: 2020-01-01 | Stop reason: HOSPADM

## 2020-01-01 RX ORDER — ALBUMIN HUMAN 50 G/1000ML
25 SOLUTION INTRAVENOUS ONCE
Status: COMPLETED | OUTPATIENT
Start: 2020-01-01 | End: 2020-01-01

## 2020-01-01 RX ORDER — FENTANYL CITRATE 50 UG/ML
50 INJECTION, SOLUTION INTRAMUSCULAR; INTRAVENOUS ONCE
Status: COMPLETED | OUTPATIENT
Start: 2020-01-01 | End: 2020-01-01

## 2020-01-01 RX ORDER — SODIUM CHLORIDE 9 MG/ML
75 INJECTION, SOLUTION INTRAVENOUS CONTINUOUS
Status: DISCONTINUED | OUTPATIENT
Start: 2020-01-01 | End: 2020-01-01

## 2020-01-01 RX ORDER — LORAZEPAM 2 MG/ML
2 INJECTION INTRAMUSCULAR
Status: DISCONTINUED | OUTPATIENT
Start: 2020-01-01 | End: 2020-01-01 | Stop reason: HOSPADM

## 2020-01-01 RX ORDER — ALBUMIN HUMAN 50 G/1000ML
12.5 SOLUTION INTRAVENOUS ONCE
Status: COMPLETED | OUTPATIENT
Start: 2020-01-01 | End: 2020-01-01

## 2020-01-01 RX ORDER — DEXTROSE MONOHYDRATE 100 MG/ML
0-250 INJECTION, SOLUTION INTRAVENOUS AS NEEDED
Status: DISCONTINUED | OUTPATIENT
Start: 2020-01-01 | End: 2020-01-01

## 2020-01-01 RX ORDER — SODIUM POLYSTYRENE SULFONATE 15 G/60ML
30 SUSPENSION ORAL; RECTAL
Status: COMPLETED | OUTPATIENT
Start: 2020-01-01 | End: 2020-01-01

## 2020-01-01 RX ORDER — FOLIC ACID 1 MG/1
1 TABLET ORAL DAILY
COMMUNITY

## 2020-01-01 RX ORDER — PROPOFOL 10 MG/ML
0-50 VIAL (ML) INTRAVENOUS
Status: DISCONTINUED | OUTPATIENT
Start: 2020-01-01 | End: 2020-01-01

## 2020-01-01 RX ORDER — HYDROMORPHONE HYDROCHLORIDE 1 MG/ML
1 INJECTION, SOLUTION INTRAMUSCULAR; INTRAVENOUS; SUBCUTANEOUS
Status: DISCONTINUED | OUTPATIENT
Start: 2020-01-01 | End: 2020-01-01 | Stop reason: HOSPADM

## 2020-01-01 RX ORDER — ALBUTEROL SULFATE 0.83 MG/ML
2.5 SOLUTION RESPIRATORY (INHALATION)
COMMUNITY

## 2020-01-01 RX ORDER — LORAZEPAM 2 MG/ML
2 INJECTION INTRAMUSCULAR EVERY 4 HOURS
Status: DISCONTINUED | OUTPATIENT
Start: 2020-01-01 | End: 2020-01-01 | Stop reason: HOSPADM

## 2020-01-01 RX ORDER — LABETALOL HYDROCHLORIDE 5 MG/ML
20 INJECTION, SOLUTION INTRAVENOUS EVERY 4 HOURS
Status: DISCONTINUED | OUTPATIENT
Start: 2020-01-01 | End: 2020-01-01

## 2020-01-01 RX ORDER — ACETAMINOPHEN 325 MG/1
650 TABLET ORAL
Status: DISCONTINUED | OUTPATIENT
Start: 2020-01-01 | End: 2020-01-01

## 2020-01-01 RX ORDER — NOREPINEPHRINE BITARTRATE/D5W 8 MG/250ML
.5-16 PLASTIC BAG, INJECTION (ML) INTRAVENOUS
Status: DISCONTINUED | OUTPATIENT
Start: 2020-01-01 | End: 2020-01-01

## 2020-01-01 RX ORDER — GLYCOPYRROLATE 0.2 MG/ML
0.2 INJECTION INTRAMUSCULAR; INTRAVENOUS
Status: DISCONTINUED | OUTPATIENT
Start: 2020-01-01 | End: 2020-01-01 | Stop reason: HOSPADM

## 2020-01-01 RX ORDER — BALSAM PERU/CASTOR OIL
OINTMENT (GRAM) TOPICAL 3 TIMES DAILY
Status: DISCONTINUED | OUTPATIENT
Start: 2020-01-01 | End: 2020-01-01

## 2020-01-01 RX ORDER — NOREPINEPHRINE BITARTRATE/D5W 8 MG/250ML
.5-3 PLASTIC BAG, INJECTION (ML) INTRAVENOUS
Status: DISCONTINUED | OUTPATIENT
Start: 2020-01-01 | End: 2020-01-01

## 2020-01-01 RX ORDER — HYDROMORPHONE HYDROCHLORIDE 1 MG/ML
2 INJECTION, SOLUTION INTRAMUSCULAR; INTRAVENOUS; SUBCUTANEOUS EVERY 4 HOURS
Status: DISCONTINUED | OUTPATIENT
Start: 2020-01-01 | End: 2020-01-01 | Stop reason: HOSPADM

## 2020-01-01 RX ORDER — KETAMINE HYDROCHLORIDE 50 MG/ML
INJECTION, SOLUTION INTRAMUSCULAR; INTRAVENOUS
Status: DISCONTINUED
Start: 2020-01-01 | End: 2020-01-01 | Stop reason: WASHOUT

## 2020-01-01 RX ORDER — ONDANSETRON 4 MG/1
4 TABLET, FILM COATED ORAL
COMMUNITY

## 2020-01-01 RX ORDER — MORPHINE SULFATE 2 MG/ML
4 INJECTION, SOLUTION INTRAMUSCULAR; INTRAVENOUS EVERY 4 HOURS
Status: DISCONTINUED | OUTPATIENT
Start: 2020-01-01 | End: 2020-01-01

## 2020-01-01 RX ORDER — SODIUM CHLORIDE 0.9 % (FLUSH) 0.9 %
5-40 SYRINGE (ML) INJECTION AS NEEDED
Status: DISCONTINUED | OUTPATIENT
Start: 2020-01-01 | End: 2020-01-01 | Stop reason: HOSPADM

## 2020-01-01 RX ORDER — HYDROMORPHONE HYDROCHLORIDE 2 MG/ML
2 INJECTION, SOLUTION INTRAMUSCULAR; INTRAVENOUS; SUBCUTANEOUS
Status: DISCONTINUED | OUTPATIENT
Start: 2020-01-01 | End: 2020-01-01 | Stop reason: HOSPADM

## 2020-01-01 RX ORDER — MIDAZOLAM HYDROCHLORIDE 1 MG/ML
INJECTION, SOLUTION INTRAMUSCULAR; INTRAVENOUS
Status: COMPLETED
Start: 2020-01-01 | End: 2020-01-01

## 2020-01-01 RX ORDER — LANOLIN ALCOHOL/MO/W.PET/CERES
100 CREAM (GRAM) TOPICAL DAILY
COMMUNITY

## 2020-01-01 RX ORDER — HEPARIN SODIUM 5000 [USP'U]/ML
5000 INJECTION, SOLUTION INTRAVENOUS; SUBCUTANEOUS EVERY 8 HOURS
COMMUNITY

## 2020-01-01 RX ORDER — TRAMADOL HYDROCHLORIDE 50 MG/1
50 TABLET ORAL EVERY 6 HOURS
COMMUNITY

## 2020-01-01 RX ORDER — HYDROMORPHONE HYDROCHLORIDE 2 MG/ML
INJECTION, SOLUTION INTRAMUSCULAR; INTRAVENOUS; SUBCUTANEOUS
Status: COMPLETED
Start: 2020-01-01 | End: 2020-01-01

## 2020-01-01 RX ORDER — ETOMIDATE 2 MG/ML
INJECTION INTRAVENOUS
Status: COMPLETED
Start: 2020-01-01 | End: 2020-01-01

## 2020-01-01 RX ORDER — IPRATROPIUM BROMIDE AND ALBUTEROL SULFATE 2.5; .5 MG/3ML; MG/3ML
3 SOLUTION RESPIRATORY (INHALATION)
COMMUNITY

## 2020-01-01 RX ORDER — TRAZODONE HYDROCHLORIDE 50 MG/1
50 TABLET ORAL
COMMUNITY

## 2020-01-01 RX ORDER — MORPHINE SULFATE 2 MG/ML
4 INJECTION, SOLUTION INTRAMUSCULAR; INTRAVENOUS
Status: DISCONTINUED | OUTPATIENT
Start: 2020-01-01 | End: 2020-01-01

## 2020-01-01 RX ORDER — THERA TABS 400 MCG
1 TAB ORAL DAILY
COMMUNITY

## 2020-01-01 RX ORDER — POTASSIUM CHLORIDE 7.45 MG/ML
10 INJECTION INTRAVENOUS
Status: COMPLETED | OUTPATIENT
Start: 2020-01-01 | End: 2020-01-01

## 2020-01-01 RX ORDER — FACIAL-BODY WIPES
10 EACH TOPICAL DAILY PRN
Status: DISCONTINUED | OUTPATIENT
Start: 2020-01-01 | End: 2020-01-01 | Stop reason: HOSPADM

## 2020-01-01 RX ORDER — SODIUM CHLORIDE 9 MG/ML
999 INJECTION, SOLUTION INTRAVENOUS ONCE
Status: COMPLETED | OUTPATIENT
Start: 2020-01-01 | End: 2020-01-01

## 2020-01-01 RX ORDER — CHLORHEXIDINE GLUCONATE 0.12 MG/ML
15 RINSE ORAL EVERY 12 HOURS
Status: DISCONTINUED | OUTPATIENT
Start: 2020-01-01 | End: 2020-01-01

## 2020-01-01 RX ORDER — SODIUM CHLORIDE 0.9 % (FLUSH) 0.9 %
5-40 SYRINGE (ML) INJECTION EVERY 8 HOURS
Status: DISCONTINUED | OUTPATIENT
Start: 2020-01-01 | End: 2020-01-01

## 2020-01-01 RX ORDER — SODIUM CHLORIDE 0.9 % (FLUSH) 0.9 %
5 SYRINGE (ML) INJECTION AS NEEDED
Status: DISCONTINUED | OUTPATIENT
Start: 2020-01-01 | End: 2020-01-01 | Stop reason: HOSPADM

## 2020-01-01 RX ORDER — LORAZEPAM 2 MG/ML
INJECTION INTRAMUSCULAR
Status: COMPLETED
Start: 2020-01-01 | End: 2020-01-01

## 2020-01-01 RX ORDER — INSULIN LISPRO 100 [IU]/ML
INJECTION, SOLUTION INTRAVENOUS; SUBCUTANEOUS EVERY 6 HOURS
Status: DISCONTINUED | OUTPATIENT
Start: 2020-01-01 | End: 2020-01-01

## 2020-01-01 RX ORDER — MAGNESIUM SULFATE 1 G/100ML
1 INJECTION INTRAVENOUS ONCE
Status: COMPLETED | OUTPATIENT
Start: 2020-01-01 | End: 2020-01-01

## 2020-01-01 RX ORDER — DOCUSATE SODIUM 50 MG/5ML
100 LIQUID ORAL 2 TIMES DAILY
Status: DISCONTINUED | OUTPATIENT
Start: 2020-01-01 | End: 2020-01-01

## 2020-01-01 RX ORDER — IPRATROPIUM BROMIDE AND ALBUTEROL SULFATE 2.5; .5 MG/3ML; MG/3ML
3 SOLUTION RESPIRATORY (INHALATION)
Status: DISCONTINUED | OUTPATIENT
Start: 2020-01-01 | End: 2020-01-01 | Stop reason: HOSPADM

## 2020-01-01 RX ORDER — PROPOFOL 10 MG/ML
INJECTION, EMULSION INTRAVENOUS
Status: COMPLETED
Start: 2020-01-01 | End: 2020-01-01

## 2020-01-01 RX ORDER — FENTANYL CITRATE 50 UG/ML
100 INJECTION, SOLUTION INTRAMUSCULAR; INTRAVENOUS ONCE
Status: COMPLETED | OUTPATIENT
Start: 2020-01-01 | End: 2020-01-01

## 2020-01-01 RX ORDER — HYDROMORPHONE HYDROCHLORIDE 1 MG/ML
1 INJECTION, SOLUTION INTRAMUSCULAR; INTRAVENOUS; SUBCUTANEOUS EVERY 4 HOURS
Status: DISCONTINUED | OUTPATIENT
Start: 2020-01-01 | End: 2020-01-01 | Stop reason: HOSPADM

## 2020-01-01 RX ORDER — FUROSEMIDE 20 MG/1
20 TABLET ORAL DAILY
COMMUNITY

## 2020-01-01 RX ORDER — CALCIUM CARBONATE 500(1250)
1 TABLET ORAL 3 TIMES DAILY
COMMUNITY

## 2020-01-01 RX ORDER — LABETALOL HYDROCHLORIDE 5 MG/ML
20 INJECTION, SOLUTION INTRAVENOUS
Status: DISCONTINUED | OUTPATIENT
Start: 2020-01-01 | End: 2020-01-01 | Stop reason: HOSPADM

## 2020-01-01 RX ORDER — SUCCINYLCHOLINE CHLORIDE 20 MG/ML
100 INJECTION INTRAMUSCULAR; INTRAVENOUS
Status: COMPLETED | OUTPATIENT
Start: 2020-01-01 | End: 2020-01-01

## 2020-01-01 RX ORDER — LISINOPRIL 10 MG/1
10 TABLET ORAL DAILY
COMMUNITY

## 2020-01-01 RX ADMIN — MORPHINE SULFATE 2 MG: 2 INJECTION, SOLUTION INTRAMUSCULAR; INTRAVENOUS at 03:20

## 2020-01-01 RX ADMIN — PIPERACILLIN AND TAZOBACTAM 3.38 G: 3; .375 INJECTION, POWDER, LYOPHILIZED, FOR SOLUTION INTRAVENOUS at 22:28

## 2020-01-01 RX ADMIN — SODIUM CHLORIDE 1000 ML: 9 INJECTION, SOLUTION INTRAVENOUS at 23:27

## 2020-01-01 RX ADMIN — HYDROMORPHONE HYDROCHLORIDE 2 MG: 2 INJECTION INTRAMUSCULAR; INTRAVENOUS; SUBCUTANEOUS at 12:52

## 2020-01-01 RX ADMIN — SODIUM POLYSTYRENE SULFONATE 30 G: 15 SUSPENSION ORAL; RECTAL at 01:00

## 2020-01-01 RX ADMIN — FENTANYL CITRATE 100 MCG: 50 INJECTION, SOLUTION INTRAMUSCULAR; INTRAVENOUS at 18:49

## 2020-01-01 RX ADMIN — DOCUSATE SODIUM 100 MG: 50 LIQUID ORAL at 18:19

## 2020-01-01 RX ADMIN — LORAZEPAM 2 MG: 2 INJECTION INTRAMUSCULAR; INTRAVENOUS at 22:26

## 2020-01-01 RX ADMIN — POTASSIUM CHLORIDE 20 MEQ: 29.8 INJECTION, SOLUTION INTRAVENOUS at 17:23

## 2020-01-01 RX ADMIN — PIPERACILLIN AND TAZOBACTAM 3.38 G: 3; .375 INJECTION, POWDER, LYOPHILIZED, FOR SOLUTION INTRAVENOUS at 02:53

## 2020-01-01 RX ADMIN — FAMOTIDINE 20 MG: 10 INJECTION, SOLUTION INTRAVENOUS at 08:18

## 2020-01-01 RX ADMIN — LORAZEPAM 2 MG: 2 INJECTION INTRAMUSCULAR; INTRAVENOUS at 12:53

## 2020-01-01 RX ADMIN — PROPOFOL 30 MCG/KG/MIN: 10 INJECTION, EMULSION INTRAVENOUS at 16:15

## 2020-01-01 RX ADMIN — Medication 10 ML: at 00:51

## 2020-01-01 RX ADMIN — Medication 10 ML: at 16:12

## 2020-01-01 RX ADMIN — LORAZEPAM 2 MG: 2 INJECTION INTRAMUSCULAR; INTRAVENOUS at 19:35

## 2020-01-01 RX ADMIN — MORPHINE SULFATE 2 MG: 2 INJECTION, SOLUTION INTRAMUSCULAR; INTRAVENOUS at 09:22

## 2020-01-01 RX ADMIN — HEPARIN SODIUM 5000 UNITS: 5000 INJECTION INTRAVENOUS; SUBCUTANEOUS at 08:18

## 2020-01-01 RX ADMIN — DEXTROSE MONOHYDRATE 10 MCG/MIN: 5 INJECTION, SOLUTION INTRAVENOUS at 22:20

## 2020-01-01 RX ADMIN — ACETAMINOPHEN 650 MG: 325 TABLET ORAL at 10:18

## 2020-01-01 RX ADMIN — FAMOTIDINE 20 MG: 10 INJECTION, SOLUTION INTRAVENOUS at 20:43

## 2020-01-01 RX ADMIN — CASTOR OIL AND BALSAM, PERU: 788; 87 OINTMENT TOPICAL at 08:55

## 2020-01-01 RX ADMIN — CHLORHEXIDINE GLUCONATE 15 ML: 0.12 RINSE ORAL at 08:56

## 2020-01-01 RX ADMIN — CHLORHEXIDINE GLUCONATE 15 ML: 0.12 RINSE ORAL at 23:31

## 2020-01-01 RX ADMIN — ACETAMINOPHEN 650 MG: 325 TABLET ORAL at 05:39

## 2020-01-01 RX ADMIN — SODIUM CHLORIDE 999 ML/HR: 9 INJECTION, SOLUTION INTRAVENOUS at 18:50

## 2020-01-01 RX ADMIN — SODIUM CHLORIDE 75 ML/HR: 9 INJECTION, SOLUTION INTRAVENOUS at 23:27

## 2020-01-01 RX ADMIN — PIPERACILLIN AND TAZOBACTAM 3.38 G: 3; .375 INJECTION, POWDER, LYOPHILIZED, FOR SOLUTION INTRAVENOUS at 06:15

## 2020-01-01 RX ADMIN — VANCOMYCIN HYDROCHLORIDE 1000 MG: 1 INJECTION, POWDER, LYOPHILIZED, FOR SOLUTION INTRAVENOUS at 16:44

## 2020-01-01 RX ADMIN — CEFTRIAXONE 1 G: 1 INJECTION, POWDER, FOR SOLUTION INTRAMUSCULAR; INTRAVENOUS at 21:47

## 2020-01-01 RX ADMIN — FAMOTIDINE 20 MG: 10 INJECTION, SOLUTION INTRAVENOUS at 20:06

## 2020-01-01 RX ADMIN — POTASSIUM CHLORIDE 20 MEQ: 29.8 INJECTION, SOLUTION INTRAVENOUS at 15:10

## 2020-01-01 RX ADMIN — SODIUM CHLORIDE, SODIUM LACTATE, POTASSIUM CHLORIDE, AND CALCIUM CHLORIDE 1000 ML: 600; 310; 30; 20 INJECTION, SOLUTION INTRAVENOUS at 02:04

## 2020-01-01 RX ADMIN — HYDROMORPHONE HYDROCHLORIDE 2 MG: 2 INJECTION INTRAMUSCULAR; INTRAVENOUS; SUBCUTANEOUS at 17:04

## 2020-01-01 RX ADMIN — CASTOR OIL AND BALSAM, PERU: 788; 87 OINTMENT TOPICAL at 11:05

## 2020-01-01 RX ADMIN — HYDROMORPHONE HYDROCHLORIDE 1 MG: 1 INJECTION, SOLUTION INTRAMUSCULAR; INTRAVENOUS; SUBCUTANEOUS at 23:35

## 2020-01-01 RX ADMIN — PROPOFOL 15 MCG/KG/MIN: 10 INJECTION, EMULSION INTRAVENOUS at 22:27

## 2020-01-01 RX ADMIN — HEPARIN SODIUM 5000 UNITS: 5000 INJECTION INTRAVENOUS; SUBCUTANEOUS at 16:48

## 2020-01-01 RX ADMIN — HEPARIN SODIUM 5000 UNITS: 5000 INJECTION INTRAVENOUS; SUBCUTANEOUS at 08:57

## 2020-01-01 RX ADMIN — SODIUM CHLORIDE 1000 ML: 9 INJECTION, SOLUTION INTRAVENOUS at 20:58

## 2020-01-01 RX ADMIN — IPRATROPIUM BROMIDE AND ALBUTEROL SULFATE 3 ML: .5; 3 SOLUTION RESPIRATORY (INHALATION) at 00:30

## 2020-01-01 RX ADMIN — CASTOR OIL AND BALSAM, PERU: 788; 87 OINTMENT TOPICAL at 08:08

## 2020-01-01 RX ADMIN — PIPERACILLIN AND TAZOBACTAM 3.38 G: 3; .375 INJECTION, POWDER, LYOPHILIZED, FOR SOLUTION INTRAVENOUS at 00:08

## 2020-01-01 RX ADMIN — HEPARIN SODIUM 5000 UNITS: 5000 INJECTION INTRAVENOUS; SUBCUTANEOUS at 02:03

## 2020-01-01 RX ADMIN — CHLORHEXIDINE GLUCONATE 15 ML: 0.12 RINSE ORAL at 20:31

## 2020-01-01 RX ADMIN — DEXTROSE MONOHYDRATE 9 MCG/MIN: 5 INJECTION, SOLUTION INTRAVENOUS at 18:23

## 2020-01-01 RX ADMIN — ETOMIDATE 30 MG: 2 INJECTION INTRAVENOUS at 20:19

## 2020-01-01 RX ADMIN — CASTOR OIL AND BALSAM, PERU: 788; 87 OINTMENT TOPICAL at 21:13

## 2020-01-01 RX ADMIN — CHLORHEXIDINE GLUCONATE 15 ML: 0.12 RINSE ORAL at 20:08

## 2020-01-01 RX ADMIN — HYDROMORPHONE HYDROCHLORIDE 2 MG: 2 INJECTION INTRAMUSCULAR; INTRAVENOUS; SUBCUTANEOUS at 19:25

## 2020-01-01 RX ADMIN — Medication 10 ML: at 06:29

## 2020-01-01 RX ADMIN — POTASSIUM CHLORIDE 20 MEQ: 29.8 INJECTION, SOLUTION INTRAVENOUS at 05:16

## 2020-01-01 RX ADMIN — FAMOTIDINE 20 MG: 10 INJECTION, SOLUTION INTRAVENOUS at 08:32

## 2020-01-01 RX ADMIN — FAMOTIDINE 20 MG: 10 INJECTION, SOLUTION INTRAVENOUS at 09:57

## 2020-01-01 RX ADMIN — Medication 10 ML: at 06:08

## 2020-01-01 RX ADMIN — INSULIN LISPRO 2 UNITS: 100 INJECTION, SOLUTION INTRAVENOUS; SUBCUTANEOUS at 05:21

## 2020-01-01 RX ADMIN — HEPARIN SODIUM 5000 UNITS: 5000 INJECTION INTRAVENOUS; SUBCUTANEOUS at 01:06

## 2020-01-01 RX ADMIN — VANCOMYCIN HYDROCHLORIDE 1000 MG: 1 INJECTION, POWDER, LYOPHILIZED, FOR SOLUTION INTRAVENOUS at 23:05

## 2020-01-01 RX ADMIN — CASTOR OIL AND BALSAM, PERU: 788; 87 OINTMENT TOPICAL at 22:49

## 2020-01-01 RX ADMIN — KETOROLAC TROMETHAMINE 30 MG: 30 INJECTION, SOLUTION INTRAMUSCULAR at 03:13

## 2020-01-01 RX ADMIN — HYDROMORPHONE HYDROCHLORIDE 1 MG: 1 INJECTION, SOLUTION INTRAMUSCULAR; INTRAVENOUS; SUBCUTANEOUS at 21:31

## 2020-01-01 RX ADMIN — FAMOTIDINE 20 MG: 10 INJECTION, SOLUTION INTRAVENOUS at 20:28

## 2020-01-01 RX ADMIN — HEPARIN SODIUM 5000 UNITS: 5000 INJECTION INTRAVENOUS; SUBCUTANEOUS at 08:20

## 2020-01-01 RX ADMIN — MORPHINE SULFATE 2 MG: 2 INJECTION, SOLUTION INTRAMUSCULAR; INTRAVENOUS at 07:34

## 2020-01-01 RX ADMIN — Medication 10 ML: at 21:14

## 2020-01-01 RX ADMIN — PROPOFOL 35 MCG/KG/MIN: 10 INJECTION, EMULSION INTRAVENOUS at 09:55

## 2020-01-01 RX ADMIN — CASTOR OIL AND BALSAM, PERU: 788; 87 OINTMENT TOPICAL at 08:18

## 2020-01-01 RX ADMIN — CHLORHEXIDINE GLUCONATE 15 ML: 0.12 RINSE ORAL at 08:21

## 2020-01-01 RX ADMIN — MORPHINE SULFATE 2 MG: 2 INJECTION, SOLUTION INTRAMUSCULAR; INTRAVENOUS at 19:17

## 2020-01-01 RX ADMIN — PROPOFOL 30 MCG/KG/MIN: 10 INJECTION, EMULSION INTRAVENOUS at 22:49

## 2020-01-01 RX ADMIN — ACETAMINOPHEN 650 MG: 325 TABLET ORAL at 17:43

## 2020-01-01 RX ADMIN — HEPARIN SODIUM 5000 UNITS: 5000 INJECTION INTRAVENOUS; SUBCUTANEOUS at 00:36

## 2020-01-01 RX ADMIN — CHLORHEXIDINE GLUCONATE 15 ML: 0.12 RINSE ORAL at 19:59

## 2020-01-01 RX ADMIN — SODIUM CHLORIDE 75 ML/HR: 9 INJECTION, SOLUTION INTRAVENOUS at 00:00

## 2020-01-01 RX ADMIN — SODIUM CHLORIDE: 900 INJECTION, SOLUTION INTRAVENOUS at 06:03

## 2020-01-01 RX ADMIN — SODIUM CHLORIDE 75 ML/HR: 9 INJECTION, SOLUTION INTRAVENOUS at 20:51

## 2020-01-01 RX ADMIN — Medication 100 MG: at 08:57

## 2020-01-01 RX ADMIN — CHLORHEXIDINE GLUCONATE 15 ML: 0.12 RINSE ORAL at 21:14

## 2020-01-01 RX ADMIN — PIPERACILLIN AND TAZOBACTAM 3.38 G: 3; .375 INJECTION, POWDER, LYOPHILIZED, FOR SOLUTION INTRAVENOUS at 16:43

## 2020-01-01 RX ADMIN — LORAZEPAM 2 MG: 2 INJECTION INTRAMUSCULAR; INTRAVENOUS at 11:31

## 2020-01-01 RX ADMIN — PROPOFOL 40 MCG/KG/MIN: 10 INJECTION, EMULSION INTRAVENOUS at 13:19

## 2020-01-01 RX ADMIN — POTASSIUM CHLORIDE 10 MEQ: 7.46 INJECTION, SOLUTION INTRAVENOUS at 05:22

## 2020-01-01 RX ADMIN — HEPARIN SODIUM 5000 UNITS: 5000 INJECTION INTRAVENOUS; SUBCUTANEOUS at 17:00

## 2020-01-01 RX ADMIN — MIDAZOLAM HYDROCHLORIDE 2 MG: 1 INJECTION, SOLUTION INTRAMUSCULAR; INTRAVENOUS at 22:21

## 2020-01-01 RX ADMIN — PROPOFOL 15 MCG/KG/MIN: 10 INJECTION, EMULSION INTRAVENOUS at 05:23

## 2020-01-01 RX ADMIN — CASTOR OIL AND BALSAM, PERU: 788; 87 OINTMENT TOPICAL at 21:33

## 2020-01-01 RX ADMIN — HEPARIN SODIUM 5000 UNITS: 5000 INJECTION INTRAVENOUS; SUBCUTANEOUS at 17:38

## 2020-01-01 RX ADMIN — PIPERACILLIN AND TAZOBACTAM 3.38 G: 3; .375 INJECTION, POWDER, LYOPHILIZED, FOR SOLUTION INTRAVENOUS at 15:28

## 2020-01-01 RX ADMIN — ACETAMINOPHEN 650 MG: 325 TABLET ORAL at 03:35

## 2020-01-01 RX ADMIN — PIPERACILLIN AND TAZOBACTAM 3.38 G: 3; .375 INJECTION, POWDER, LYOPHILIZED, FOR SOLUTION INTRAVENOUS at 23:00

## 2020-01-01 RX ADMIN — LORAZEPAM 2 MG: 2 INJECTION INTRAMUSCULAR; INTRAVENOUS at 17:05

## 2020-01-01 RX ADMIN — PROPOFOL 30 MCG/KG/MIN: 10 INJECTION, EMULSION INTRAVENOUS at 23:27

## 2020-01-01 RX ADMIN — FAMOTIDINE 20 MG: 10 INJECTION, SOLUTION INTRAVENOUS at 20:08

## 2020-01-01 RX ADMIN — HEPARIN SODIUM 5000 UNITS: 5000 INJECTION INTRAVENOUS; SUBCUTANEOUS at 00:02

## 2020-01-01 RX ADMIN — ACETAMINOPHEN 650 MG: 325 TABLET ORAL at 21:31

## 2020-01-01 RX ADMIN — GLYCOPYRROLATE 0.2 MG: 0.2 INJECTION, SOLUTION INTRAMUSCULAR; INTRAVENOUS at 03:20

## 2020-01-01 RX ADMIN — SUCCINYLCHOLINE CHLORIDE 100 MG: 20 INJECTION INTRAMUSCULAR; INTRAVENOUS at 20:20

## 2020-01-01 RX ADMIN — SODIUM CHLORIDE: 900 INJECTION, SOLUTION INTRAVENOUS at 11:00

## 2020-01-01 RX ADMIN — SUCCINYLCHOLINE CHLORIDE 100 MG: 20 INJECTION, SOLUTION INTRAMUSCULAR; INTRAVENOUS at 20:20

## 2020-01-01 RX ADMIN — CASTOR OIL AND BALSAM, PERU: 788; 87 OINTMENT TOPICAL at 10:14

## 2020-01-01 RX ADMIN — SODIUM CHLORIDE 500 ML: 900 INJECTION, SOLUTION INTRAVENOUS at 03:38

## 2020-01-01 RX ADMIN — CASTOR OIL AND BALSAM, PERU: 788; 87 OINTMENT TOPICAL at 15:43

## 2020-01-01 RX ADMIN — Medication 5 MCG/KG/MIN: at 20:37

## 2020-01-01 RX ADMIN — LORAZEPAM 2 MG: 2 INJECTION INTRAMUSCULAR; INTRAVENOUS at 22:44

## 2020-01-01 RX ADMIN — LORAZEPAM 2 MG: 2 INJECTION INTRAMUSCULAR; INTRAVENOUS at 07:15

## 2020-01-01 RX ADMIN — PROPOFOL 25 MCG/KG/MIN: 10 INJECTION, EMULSION INTRAVENOUS at 06:28

## 2020-01-01 RX ADMIN — POTASSIUM CHLORIDE 20 MEQ: 29.8 INJECTION, SOLUTION INTRAVENOUS at 08:04

## 2020-01-01 RX ADMIN — CASTOR OIL AND BALSAM, PERU: 788; 87 OINTMENT TOPICAL at 15:00

## 2020-01-01 RX ADMIN — LORAZEPAM 2 MG: 2 INJECTION INTRAMUSCULAR; INTRAVENOUS at 09:22

## 2020-01-01 RX ADMIN — METOROPROLOL TARTRATE 2.5 MG: 5 INJECTION, SOLUTION INTRAVENOUS at 01:17

## 2020-01-01 RX ADMIN — CASTOR OIL AND BALSAM, PERU: 788; 87 OINTMENT TOPICAL at 08:04

## 2020-01-01 RX ADMIN — POTASSIUM BICARBONATE 40 MEQ: 782 TABLET, EFFERVESCENT ORAL at 05:21

## 2020-01-01 RX ADMIN — PROPOFOL 15 MCG/KG/MIN: 10 INJECTION, EMULSION INTRAVENOUS at 17:29

## 2020-01-01 RX ADMIN — FENTANYL CITRATE 50 MCG: 50 INJECTION, SOLUTION INTRAMUSCULAR; INTRAVENOUS at 21:44

## 2020-01-01 RX ADMIN — PIPERACILLIN AND TAZOBACTAM 3.38 G: 3; .375 INJECTION, POWDER, LYOPHILIZED, FOR SOLUTION INTRAVENOUS at 06:18

## 2020-01-01 RX ADMIN — SODIUM CHLORIDE 1000 ML: 9 INJECTION, SOLUTION INTRAVENOUS at 21:11

## 2020-01-01 RX ADMIN — HEPARIN SODIUM 5000 UNITS: 5000 INJECTION INTRAVENOUS; SUBCUTANEOUS at 17:32

## 2020-01-01 RX ADMIN — Medication 100 MG: at 08:34

## 2020-01-01 RX ADMIN — PIPERACILLIN AND TAZOBACTAM 3.38 G: 3; .375 INJECTION, POWDER, LYOPHILIZED, FOR SOLUTION INTRAVENOUS at 08:26

## 2020-01-01 RX ADMIN — MORPHINE SULFATE 4 MG: 2 INJECTION, SOLUTION INTRAMUSCULAR; INTRAVENOUS at 15:08

## 2020-01-01 RX ADMIN — PIPERACILLIN AND TAZOBACTAM 3.38 G: 3; .375 INJECTION, POWDER, LYOPHILIZED, FOR SOLUTION INTRAVENOUS at 15:43

## 2020-01-01 RX ADMIN — LORAZEPAM 2 MG: 2 INJECTION INTRAMUSCULAR; INTRAVENOUS at 19:24

## 2020-01-01 RX ADMIN — Medication 30 ML: at 10:58

## 2020-01-01 RX ADMIN — CASTOR OIL AND BALSAM, PERU: 788; 87 OINTMENT TOPICAL at 15:29

## 2020-01-01 RX ADMIN — CASTOR OIL AND BALSAM, PERU: 788; 87 OINTMENT TOPICAL at 22:06

## 2020-01-01 RX ADMIN — AZITHROMYCIN MONOHYDRATE 500 MG: 500 INJECTION, POWDER, LYOPHILIZED, FOR SOLUTION INTRAVENOUS at 22:00

## 2020-01-01 RX ADMIN — FAMOTIDINE 20 MG: 10 INJECTION, SOLUTION INTRAVENOUS at 20:26

## 2020-01-01 RX ADMIN — PIPERACILLIN AND TAZOBACTAM 3.38 G: 3; .375 INJECTION, POWDER, LYOPHILIZED, FOR SOLUTION INTRAVENOUS at 15:00

## 2020-01-01 RX ADMIN — Medication 100 MG: at 08:45

## 2020-01-01 RX ADMIN — PIPERACILLIN AND TAZOBACTAM 3.38 G: 3; .375 INJECTION, POWDER, LYOPHILIZED, FOR SOLUTION INTRAVENOUS at 22:51

## 2020-01-01 RX ADMIN — Medication 10 ML: at 05:27

## 2020-01-01 RX ADMIN — Medication 10 ML: at 08:35

## 2020-01-01 RX ADMIN — Medication 30 ML: at 12:05

## 2020-01-01 RX ADMIN — Medication 50 MCG/HR: at 06:15

## 2020-01-01 RX ADMIN — PIPERACILLIN AND TAZOBACTAM 3.38 G: 3; .375 INJECTION, POWDER, LYOPHILIZED, FOR SOLUTION INTRAVENOUS at 13:57

## 2020-01-01 RX ADMIN — MAGNESIUM SULFATE HEPTAHYDRATE 1 G: 1 INJECTION, SOLUTION INTRAVENOUS at 08:57

## 2020-01-01 RX ADMIN — CHLORHEXIDINE GLUCONATE 15 ML: 0.12 RINSE ORAL at 21:33

## 2020-01-01 RX ADMIN — HEPARIN SODIUM 5000 UNITS: 5000 INJECTION INTRAVENOUS; SUBCUTANEOUS at 16:15

## 2020-01-01 RX ADMIN — Medication 10 ML: at 13:58

## 2020-01-01 RX ADMIN — PIPERACILLIN AND TAZOBACTAM 3.38 G: 3; .375 INJECTION, POWDER, LYOPHILIZED, FOR SOLUTION INTRAVENOUS at 16:11

## 2020-01-01 RX ADMIN — Medication 40 ML: at 22:06

## 2020-01-01 RX ADMIN — SODIUM CHLORIDE 75 ML/HR: 9 INJECTION, SOLUTION INTRAVENOUS at 11:02

## 2020-01-01 RX ADMIN — PIPERACILLIN AND TAZOBACTAM 3.38 G: 3; .375 INJECTION, POWDER, LYOPHILIZED, FOR SOLUTION INTRAVENOUS at 23:31

## 2020-01-01 RX ADMIN — Medication 10 ML: at 21:02

## 2020-01-01 RX ADMIN — PIPERACILLIN AND TAZOBACTAM 3.38 G: 3; .375 INJECTION, POWDER, LYOPHILIZED, FOR SOLUTION INTRAVENOUS at 01:56

## 2020-01-01 RX ADMIN — PIPERACILLIN AND TAZOBACTAM 3.38 G: 3; .375 INJECTION, POWDER, LYOPHILIZED, FOR SOLUTION INTRAVENOUS at 15:13

## 2020-01-01 RX ADMIN — CHLORHEXIDINE GLUCONATE 15 ML: 0.12 RINSE ORAL at 08:26

## 2020-01-01 RX ADMIN — HEPARIN SODIUM 5000 UNITS: 5000 INJECTION INTRAVENOUS; SUBCUTANEOUS at 01:29

## 2020-01-01 RX ADMIN — Medication 10 ML: at 21:42

## 2020-01-01 RX ADMIN — LORAZEPAM 2 MG: 2 INJECTION INTRAMUSCULAR; INTRAVENOUS at 17:54

## 2020-01-01 RX ADMIN — PROPOFOL 25 MCG/KG/MIN: 10 INJECTION, EMULSION INTRAVENOUS at 23:49

## 2020-01-01 RX ADMIN — LORAZEPAM 2 MG: 2 INJECTION INTRAMUSCULAR; INTRAVENOUS at 18:38

## 2020-01-01 RX ADMIN — MORPHINE SULFATE 2 MG: 2 INJECTION, SOLUTION INTRAMUSCULAR; INTRAVENOUS at 11:31

## 2020-01-01 RX ADMIN — CASTOR OIL AND BALSAM, PERU: 788; 87 OINTMENT TOPICAL at 08:59

## 2020-01-01 RX ADMIN — SODIUM CHLORIDE: 900 INJECTION, SOLUTION INTRAVENOUS at 08:29

## 2020-01-01 RX ADMIN — POTASSIUM CHLORIDE 10 MEQ: 7.46 INJECTION, SOLUTION INTRAVENOUS at 06:15

## 2020-01-01 RX ADMIN — FAMOTIDINE 20 MG: 10 INJECTION, SOLUTION INTRAVENOUS at 08:06

## 2020-01-01 RX ADMIN — Medication 10 ML: at 21:34

## 2020-01-01 RX ADMIN — CHLORHEXIDINE GLUCONATE 15 ML: 0.12 RINSE ORAL at 08:57

## 2020-01-01 RX ADMIN — MORPHINE SULFATE 2 MG: 2 INJECTION, SOLUTION INTRAMUSCULAR; INTRAVENOUS at 17:21

## 2020-01-01 RX ADMIN — Medication 30 ML: at 08:59

## 2020-01-01 RX ADMIN — ACETAMINOPHEN 650 MG: 325 TABLET ORAL at 15:35

## 2020-01-01 RX ADMIN — CHLORHEXIDINE GLUCONATE 15 ML: 0.12 RINSE ORAL at 20:57

## 2020-01-01 RX ADMIN — PROPOFOL 25 MCG/KG/MIN: 10 INJECTION, EMULSION INTRAVENOUS at 13:58

## 2020-01-01 RX ADMIN — BACITRACIN 1 PACKET: 500 OINTMENT TOPICAL at 20:18

## 2020-01-01 RX ADMIN — DOCUSATE SODIUM 100 MG: 50 LIQUID ORAL at 08:43

## 2020-01-01 RX ADMIN — HEPARIN SODIUM 5000 UNITS: 5000 INJECTION INTRAVENOUS; SUBCUTANEOUS at 08:06

## 2020-01-01 RX ADMIN — POTASSIUM CHLORIDE 20 MEQ: 29.8 INJECTION, SOLUTION INTRAVENOUS at 06:19

## 2020-01-01 RX ADMIN — ALBUMIN (HUMAN) 25 G: 12.5 INJECTION, SOLUTION INTRAVENOUS at 02:07

## 2020-01-01 RX ADMIN — Medication 50 MCG/HR: at 20:57

## 2020-01-01 RX ADMIN — CHLORHEXIDINE GLUCONATE 15 ML: 0.12 RINSE ORAL at 08:08

## 2020-01-01 RX ADMIN — ACETAMINOPHEN 650 MG: 325 TABLET ORAL at 00:59

## 2020-01-01 RX ADMIN — Medication 10 ML: at 15:15

## 2020-01-01 RX ADMIN — HEPARIN SODIUM 5000 UNITS: 5000 INJECTION INTRAVENOUS; SUBCUTANEOUS at 16:16

## 2020-01-01 RX ADMIN — CASTOR OIL AND BALSAM, PERU: 788; 87 OINTMENT TOPICAL at 08:19

## 2020-01-01 RX ADMIN — DOCUSATE SODIUM 100 MG: 50 LIQUID ORAL at 08:18

## 2020-01-01 RX ADMIN — Medication 100 MG: at 08:20

## 2020-01-01 RX ADMIN — VANCOMYCIN HYDROCHLORIDE 1000 MG: 1 INJECTION, POWDER, LYOPHILIZED, FOR SOLUTION INTRAVENOUS at 16:15

## 2020-01-01 RX ADMIN — FAMOTIDINE 20 MG: 10 INJECTION, SOLUTION INTRAVENOUS at 08:57

## 2020-01-01 RX ADMIN — VANCOMYCIN HYDROCHLORIDE 1000 MG: 1 INJECTION, POWDER, LYOPHILIZED, FOR SOLUTION INTRAVENOUS at 06:16

## 2020-01-01 RX ADMIN — VANCOMYCIN HYDROCHLORIDE 1250 MG: 10 INJECTION, POWDER, LYOPHILIZED, FOR SOLUTION INTRAVENOUS at 02:54

## 2020-01-01 RX ADMIN — HEPARIN SODIUM 5000 UNITS: 5000 INJECTION INTRAVENOUS; SUBCUTANEOUS at 08:28

## 2020-01-01 RX ADMIN — CASTOR OIL AND BALSAM, PERU: 788; 87 OINTMENT TOPICAL at 16:15

## 2020-01-01 RX ADMIN — CASTOR OIL AND BALSAM, PERU: 788; 87 OINTMENT TOPICAL at 21:31

## 2020-01-01 RX ADMIN — Medication 30 ML: at 09:52

## 2020-01-01 RX ADMIN — HYDROMORPHONE HYDROCHLORIDE 1 MG: 1 INJECTION, SOLUTION INTRAMUSCULAR; INTRAVENOUS; SUBCUTANEOUS at 07:15

## 2020-01-01 RX ADMIN — HEPARIN SODIUM 5000 UNITS: 5000 INJECTION INTRAVENOUS; SUBCUTANEOUS at 17:20

## 2020-01-01 RX ADMIN — FAMOTIDINE 20 MG: 10 INJECTION, SOLUTION INTRAVENOUS at 21:36

## 2020-01-01 RX ADMIN — ACETAMINOPHEN 650 MG: 325 TABLET ORAL at 16:16

## 2020-01-01 RX ADMIN — Medication 30 ML: at 08:56

## 2020-01-01 RX ADMIN — METOPROLOL TARTRATE 2.5 MG: 5 INJECTION INTRAVENOUS at 22:00

## 2020-01-01 RX ADMIN — VANCOMYCIN HYDROCHLORIDE 1500 MG: 10 INJECTION, POWDER, LYOPHILIZED, FOR SOLUTION INTRAVENOUS at 01:06

## 2020-01-01 RX ADMIN — HEPARIN SODIUM 5000 UNITS: 5000 INJECTION INTRAVENOUS; SUBCUTANEOUS at 08:32

## 2020-01-01 RX ADMIN — GLYCOPYRROLATE 0.2 MG: 0.2 INJECTION, SOLUTION INTRAMUSCULAR; INTRAVENOUS at 07:34

## 2020-01-01 RX ADMIN — SODIUM PHOSPHATE, MONOBASIC, MONOHYDRATE: 276; 142 INJECTION, SOLUTION INTRAVENOUS at 05:16

## 2020-01-01 RX ADMIN — Medication 30 ML: at 22:51

## 2020-01-01 RX ADMIN — CASTOR OIL AND BALSAM, PERU: 788; 87 OINTMENT TOPICAL at 18:19

## 2020-01-01 RX ADMIN — HYDROMORPHONE HYDROCHLORIDE 2 MG: 2 INJECTION INTRAMUSCULAR; INTRAVENOUS; SUBCUTANEOUS at 22:44

## 2020-01-01 RX ADMIN — POTASSIUM CHLORIDE 20 MEQ: 29.8 INJECTION, SOLUTION INTRAVENOUS at 08:57

## 2020-01-01 RX ADMIN — MIDAZOLAM 2 MG: 1 INJECTION INTRAMUSCULAR; INTRAVENOUS at 22:21

## 2020-01-01 RX ADMIN — PIPERACILLIN AND TAZOBACTAM 3.38 G: 3; .375 INJECTION, POWDER, LYOPHILIZED, FOR SOLUTION INTRAVENOUS at 14:36

## 2020-01-01 RX ADMIN — PROPOFOL 5 MCG/KG/MIN: 10 INJECTION, EMULSION INTRAVENOUS at 20:37

## 2020-01-01 RX ADMIN — HEPARIN SODIUM 5000 UNITS: 5000 INJECTION INTRAVENOUS; SUBCUTANEOUS at 08:45

## 2020-01-01 RX ADMIN — PIPERACILLIN AND TAZOBACTAM 3.38 G: 3; .375 INJECTION, POWDER, LYOPHILIZED, FOR SOLUTION INTRAVENOUS at 06:03

## 2020-01-01 RX ADMIN — Medication 10 ML: at 21:41

## 2020-01-01 RX ADMIN — Medication 10 ML: at 05:18

## 2020-01-01 RX ADMIN — Medication 40 ML: at 05:25

## 2020-01-01 RX ADMIN — PIPERACILLIN AND TAZOBACTAM 3.38 G: 3; .375 INJECTION, POWDER, LYOPHILIZED, FOR SOLUTION INTRAVENOUS at 23:05

## 2020-01-01 RX ADMIN — CHLORHEXIDINE GLUCONATE 15 ML: 0.12 RINSE ORAL at 08:48

## 2020-01-01 RX ADMIN — DEXTROSE MONOHYDRATE 15 MCG/MIN: 5 INJECTION, SOLUTION INTRAVENOUS at 04:45

## 2020-01-01 RX ADMIN — HEPARIN SODIUM 5000 UNITS: 5000 INJECTION INTRAVENOUS; SUBCUTANEOUS at 10:15

## 2020-01-01 RX ADMIN — Medication 10 ML: at 15:00

## 2020-01-01 RX ADMIN — HEPARIN SODIUM 5000 UNITS: 5000 INJECTION INTRAVENOUS; SUBCUTANEOUS at 00:15

## 2020-01-01 RX ADMIN — Medication 100 MG: at 12:00

## 2020-01-01 RX ADMIN — Medication 10 ML: at 16:48

## 2020-01-01 RX ADMIN — Medication 10 ML: at 05:59

## 2020-01-01 RX ADMIN — MORPHINE SULFATE 4 MG: 2 INJECTION, SOLUTION INTRAMUSCULAR; INTRAVENOUS at 13:55

## 2020-01-01 RX ADMIN — Medication 50 MCG/HR: at 04:50

## 2020-01-01 RX ADMIN — CASTOR OIL AND BALSAM, PERU: 788; 87 OINTMENT TOPICAL at 15:14

## 2020-01-01 RX ADMIN — CASTOR OIL AND BALSAM, PERU: 788; 87 OINTMENT TOPICAL at 21:01

## 2020-01-01 RX ADMIN — ALBUMIN (HUMAN) 12.5 G: 12.5 INJECTION, SOLUTION INTRAVENOUS at 21:54

## 2020-01-01 RX ADMIN — VANCOMYCIN HYDROCHLORIDE 1000 MG: 1 INJECTION, POWDER, LYOPHILIZED, FOR SOLUTION INTRAVENOUS at 06:14

## 2020-01-01 RX ADMIN — Medication 10 ML: at 15:29

## 2020-01-01 RX ADMIN — CHLORHEXIDINE GLUCONATE 15 ML: 0.12 RINSE ORAL at 11:06

## 2020-01-01 RX ADMIN — HEPARIN SODIUM 5000 UNITS: 5000 INJECTION INTRAVENOUS; SUBCUTANEOUS at 01:10

## 2020-01-01 RX ADMIN — FAMOTIDINE 20 MG: 10 INJECTION, SOLUTION INTRAVENOUS at 08:10

## 2020-01-01 RX ADMIN — KETOROLAC TROMETHAMINE 30 MG: 30 INJECTION, SOLUTION INTRAMUSCULAR at 21:21

## 2020-01-01 RX ADMIN — PROPOFOL 35 MCG/KG/MIN: 10 INJECTION, EMULSION INTRAVENOUS at 01:17

## 2020-01-01 RX ADMIN — CASTOR OIL AND BALSAM, PERU: 788; 87 OINTMENT TOPICAL at 21:42

## 2020-01-01 RX ADMIN — FAMOTIDINE 20 MG: 10 INJECTION, SOLUTION INTRAVENOUS at 08:27

## 2020-01-01 RX ADMIN — HYDROMORPHONE HYDROCHLORIDE 1 MG: 1 INJECTION, SOLUTION INTRAMUSCULAR; INTRAVENOUS; SUBCUTANEOUS at 17:24

## 2020-01-01 RX ADMIN — VANCOMYCIN HYDROCHLORIDE 1000 MG: 1 INJECTION, POWDER, LYOPHILIZED, FOR SOLUTION INTRAVENOUS at 22:50

## 2020-01-01 RX ADMIN — FAMOTIDINE 20 MG: 10 INJECTION, SOLUTION INTRAVENOUS at 08:20

## 2020-01-01 RX ADMIN — MORPHINE SULFATE 2 MG: 2 INJECTION, SOLUTION INTRAMUSCULAR; INTRAVENOUS at 10:41

## 2020-01-01 RX ADMIN — ACETAMINOPHEN 650 MG: 325 TABLET ORAL at 08:34

## 2020-01-01 RX ADMIN — POTASSIUM CHLORIDE 20 MEQ: 29.8 INJECTION, SOLUTION INTRAVENOUS at 10:04

## 2020-01-01 RX ADMIN — SODIUM CHLORIDE: 900 INJECTION, SOLUTION INTRAVENOUS at 05:38

## 2020-01-01 RX ADMIN — CASTOR OIL AND BALSAM, PERU: 788; 87 OINTMENT TOPICAL at 17:38

## 2020-01-01 RX ADMIN — LORAZEPAM 2 MG: 2 INJECTION INTRAMUSCULAR; INTRAVENOUS at 03:20

## 2020-01-01 RX ADMIN — PROPOFOL 25 MCG/KG/MIN: 10 INJECTION, EMULSION INTRAVENOUS at 08:05

## 2020-01-01 RX ADMIN — Medication 10 ML: at 05:21

## 2020-01-01 RX ADMIN — LORAZEPAM 2 MG: 2 INJECTION INTRAMUSCULAR; INTRAVENOUS at 07:34

## 2020-01-01 RX ADMIN — HYDROMORPHONE HYDROCHLORIDE 1 MG: 1 INJECTION, SOLUTION INTRAMUSCULAR; INTRAVENOUS; SUBCUTANEOUS at 19:35

## 2020-01-01 RX ADMIN — PIPERACILLIN AND TAZOBACTAM 3.38 G: 3; .375 INJECTION, POWDER, LYOPHILIZED, FOR SOLUTION INTRAVENOUS at 06:45

## 2020-01-01 RX ADMIN — MORPHINE SULFATE 2 MG: 2 INJECTION, SOLUTION INTRAMUSCULAR; INTRAVENOUS at 18:31

## 2020-01-01 RX ADMIN — HYDROMORPHONE HYDROCHLORIDE 1 MG: 1 INJECTION, SOLUTION INTRAMUSCULAR; INTRAVENOUS; SUBCUTANEOUS at 03:13

## 2020-01-01 RX ADMIN — SODIUM CHLORIDE 1000 ML: 9 INJECTION, SOLUTION INTRAVENOUS at 22:22

## 2020-08-30 PROBLEM — A41.9 SEPTIC SHOCK (HCC): Status: ACTIVE | Noted: 2020-01-01

## 2020-08-30 PROBLEM — R65.21 SEPTIC SHOCK (HCC): Status: ACTIVE | Noted: 2020-01-01

## 2020-08-31 PROBLEM — J96.01 ACUTE RESPIRATORY FAILURE WITH HYPOXEMIA (HCC): Status: ACTIVE | Noted: 2020-01-01

## 2020-08-31 PROBLEM — N17.9 ARF (ACUTE RENAL FAILURE) (HCC): Status: ACTIVE | Noted: 2020-01-01

## 2020-08-31 PROBLEM — D72.829 LEUKOCYTOSIS: Status: ACTIVE | Noted: 2020-01-01

## 2020-08-31 NOTE — FORENSIC NURSE
FRANK spoke with [facility]. Per [facility], next of kin on file is Frankyashly Harry, [phone].

## 2020-08-31 NOTE — WOUND CARE
WOCN Note:  
New consult for sacrum. Chart shows: 
Admitted for cardiovascular septic shock, intubated, sedated, on pressors History of CVA, seizures, Segovia sarcoma, HepC Admitted from [address] prior to that Assessment:  
Intubated & sedated. RN at bedside to assist in full turn from right to left. [place] Bed: [facility] with air mattress Bilateral heel skin intact and without erythema. Heels offloaded with pillows. 1. POA, sacrum, upper gluteal cleft, and predominantly toward left buttock = 8 x 8 x 0.1 cm  95% non-blanching indurated purple 5% open non-blanching purple in center where skin slid away. Venelex and foam dressing applied. 2. POA, right lateral foot deep tissue injury = 1 x 0.8 x 0 cm  100% non-blanching burgundy. Recommendations:   
Sacrum & right lateral foot: venelex twice daily and cover with sacral foam dressing Turn/reposition approximately every 2 hours and offload heels with pillows at all times in bed. Envision air mattress ordered via [employer] [id number]. Use only flat sheet and one incontinence pad. Please call [employer] @ [phone] for bed to be picked up at discharge. Discussed with MD and RN. Transition of Care: Plan to follow weekly and as needed while admitted to hospital.  
 
ELLIOTT Lorenzana, RN, [employer] Certified Wound, Ostomy, Continence Nurse 
office [phone] 
pager [phone] or call  to page

## 2020-08-31 NOTE — PROGRESS NOTES
Problem: Breathing Pattern - Ineffective Goal: *Absence of hypoxia Outcome: Progressing Towards Goal 
Goal: *Use of effective breathing techniques Outcome: Progressing Towards Goal 
  
Problem: Ventilator Management Goal: *Adequate oxygenation and ventilation Outcome: Progressing Towards Goal 
Goal: *Patient maintains clear airway/free of aspiration Outcome: Progressing Towards Goal 
  
Problem: Risk for Spread of Infection Goal: Prevent transmission of infectious organism to others Description: Prevent the transmission of infectious organisms to other patients, staff members, and visitors. Outcome: Progressing Towards Goal 
  
Problem: Non-Violent Restraints Goal: *Removal from restraints as soon as assessed to be safe Outcome: Progressing Towards Goal 
Goal: *No harm/injury to patient while restraints in use Outcome: Progressing Towards Goal 
Goal: *Patient's dignity will be maintained Outcome: Progressing Towards Goal 
Goal: Non-violent Restaints:Standard Interventions Outcome: Progressing Towards Goal 
  
Problem: Pressure Injury - Risk of 
Goal: *Prevention of pressure injury Description: Document Jay Scale and appropriate interventions in the flowsheet. Outcome: Progressing Towards Goal 
Note: Pressure Injury Interventions: 
Sensory Interventions: Assess changes in LOC, Assess need for specialty bed, Avoid rigorous massage over bony prominences, Check visual cues for pain, Float heels, Keep linens dry and wrinkle-free, Minimize linen layers, Monitor skin under medical devices, Pressure redistribution bed/mattress (bed type), Turn and reposition approx. every two hours (pillows and wedges if needed) Moisture Interventions: Absorbent underpads, Assess need for specialty bed, Check for incontinence Q2 hours and as needed, Internal/External urinary devices, Minimize layers Activity Interventions: Assess need for specialty bed, Pressure redistribution bed/mattress(bed type) Mobility Interventions: Assess need for specialty bed, Float heels, HOB 30 degrees or less, Pressure redistribution bed/mattress (bed type), Turn and reposition approx. every two hours(pillow and wedges) Nutrition Interventions: Discuss nutritional consult with provider Friction and Shear Interventions: Apply protective barrier, creams and emollients, HOB 30 degrees or less, Minimize layers

## 2020-08-31 NOTE — PROGRESS NOTES
Problem: Breathing Pattern - Ineffective Goal: *Absence of hypoxia Outcome: Progressing Towards Goal 
Goal: *Use of effective breathing techniques Outcome: Progressing Towards Goal 
  
Problem: Patient Education: Go to Patient Education Activity Goal: Patient/Family Education Outcome: Progressing Towards Goal

## 2020-08-31 NOTE — PROCEDURES
[facility] Procedure Note - Arterial Access:  
Performed by Prabha Vidal NP . Immediately prior to the procedure, the patient was reevaluated and found suitable for the planned procedure and any planned medications. Immediately prior to the procedure a time out was called to verify the correct patient, procedure, equipment, staff, and marking as appropriate. Insertion Date: 08/31/2020 [id number] Procedure Location:  ICU. Condition: Emergency. Consent:  NO.    
Method: Seldinger technique. Site Prep: ChloraPrep. Procedure: Arterial Catheter Insertion in Left, Femoral Artery Catheter inserted into a new site. Number of Attempts:  1 Indication: Monitoring and Blood Drawing. Complication None. Performed By:performed the above procedure myself. The procedure was tolerated well.

## 2020-08-31 NOTE — ED PROVIDER NOTES
HPI .  History is from EMS. Patient is nonverbal limiting history. Patient has a history of Segovia sarcoma, hepatitis C, seizures, and epilepsy, patient had a stroke several weeks ago with residual left-sided weakness. Patient reportedly was at [address] [place] [address] and discharged to [facility]. EMS reports he was in assisted living but he may have been in the skilled nursing part of the facility. He has a significant left hemiparesis. His baseline reportedly was that he could talk some. I do not believe he has been ambulatory. EMS reports that the patient is a full code. A nurse taking report from the facility got the history that he was given tramadol and trazodone which seemed to \"snow\" him. Patient then became tachypneic with low pulse ox is in respiratory distress. Despite 100% nonrebreather  mask pulse ox is in the mid 80s. Patient definitely is not responding to IV sticks or to voice. He is not protecting his airway at all. Past Medical History:  
Diagnosis Date  Epilepsia ([facility] [address]  Segovia sarcoma ([facility] [address] c4-c7  Hepatitis C   
 Seizures ([facility] [address] Past Surgical History:  
Procedure Laterality Date  HX APPENDECTOMY  HX ORTHOPAEDIC History reviewed. No pertinent family history. Social History Socioeconomic History  Marital status:  Spouse name: Not on file  Number of children: Not on file  Years of education: Not on file  Highest education level: Not on file Occupational History  Not on file Social Needs  Financial resource strain: Not on file  Food insecurity Worry: Not on file Inability: Not on file  Transportation needs Medical: Not on file Non-medical: Not on file Tobacco Use  Smoking status: Current Every Day Smoker Packs/day: 1.00 Substance and Sexual Activity  Alcohol use: Yes Comment: 12 pack every 2 days  Drug use: No  
 Sexual activity: Not on file Lifestyle  Physical activity Days per week: Not on file Minutes per session: Not on file  Stress: Not on file Relationships  Social connections Talks on phone: Not on file Gets together: Not on file Attends Restoration service: Not on file Active member of club or organization: Not on file Attends meetings of clubs or organizations: Not on file Relationship status: Not on file  Intimate partner violence Fear of current or ex partner: Not on file Emotionally abused: Not on file Physically abused: Not on file Forced sexual activity: Not on file Other Topics Concern  Not on file Social History Narrative  Not on file ALLERGIES: Patient has no known allergies. Review of Systems Reason unable to perform ROS: Severely ill and nonverbal.  
 
 
Vitals:  
 08/30/20 2010 BP: (!) 155/115 Pulse: (!) 118 Resp: (!) 33 Temp: 99.7 °F (37.6 °C) SpO2: (!) 83% Physical Exam 
Vitals signs and nursing note reviewed. Constitutional:   
   Comments: Appears chronically ill HENT:  
   Head: Normocephalic and atraumatic. Eyes:  
   Pupils: Pupils are equal, round, and reactive to light. Neck: Musculoskeletal: Normal range of motion and neck supple. Cardiovascular:  
   Rate and Rhythm: Regular rhythm. Heart sounds: Normal heart sounds. No murmur. No friction rub. No gallop. Comments: Pulse rate 115 Pulmonary:  
   Effort: No respiratory distress. Breath sounds: No wheezing or rales. Comments: 100% mask; gurgling respiratory efforts; tachypneic; coarse inspiratory and expiratory breath sounds Abdominal:  
   Palpations: Abdomen is soft. Tenderness: There is no abdominal tenderness. There is no rebound. Musculoskeletal: Normal range of motion. General: No tenderness. Skin: 
   Findings: No erythema. Neurological:  
   Mental Status: He is alert. Cranial Nerves: No cranial nerve deficit. Comments: Motor; left-sided weakness Psychiatric:     
   Behavior: Behavior normal.  
 
  
 
MDM Number of Diagnoses or Management Options Acute renal failure, unspecified acute renal failure type [facility]):  
Hypotension, unspecified hypotension type:  
Unresponsive episode: Total critical care time spent exclusive of procedures:  60 minutes Intubation Date/Time: 8/30/2020 8:36 PM 
Performed by: Samuel Hernandez MD 
Authorized by: Samuel Hernandez MD  
 
Consent:  
  Consent obtained:  Emergent situation Pre-procedure details:  
  Patient status:  Unresponsive Procedure details:  
  Preoxygenation:  Nonrebreather mask CPR in progress: no Intubation method:  Oral 
  Laryngoscope blade: Mac 3 Tube size (mm):  7.5 Tube type:  Cuffed Number of attempts:  1 Cricoid pressure: no   
  Tube visualized through cords: yes Placement assessment: ETT to lip:  22 Tube secured with:  ETT rosario Breath sounds:  Reduced on left Placement verification: chest rise, condensation and direct visualization Post-procedure details:  
  Patient tolerance of procedure: Tolerated well, no immediate complications ED EKG interpretation: 
Rhythm: sinus tachycardia; and regular . Rate (approx.): 110; Axis: normal; P wave: normal; QRS interval: normal ; ST/T wave: normal; in  Lead: ; Other findings: . This EKG was interpreted by Roddy Avendano MD,ED Provider. 9:07 PM 
 
 
 
 
 
 
  
EMS blood pressures were in the 40Q systolic. Patient's blood pressure was well above 100 initially in the ER and then decreased to 09U systolic again. Fluids continue and blood pressure is up to 86 systolic. Patient had thick purulent sputum visible in the epiglottic and glottic area during intubation. Antibiotics for respiratory source will be started. Chest x-ray was done a few seconds ago.  Roddy Avendano MD 
9:30 PM

## 2020-08-31 NOTE — ROUTINE PROCESS
TRANSFER - OUT REPORT: 
 
Verbal report given to [facility], RN(name) on Marixa Jimenez  being transferred to ICU(unit) for routine progression of care Report consisted of patients Situation, Background, Assessment and  
Recommendations(SBAR). Information from the following report(s) SBAR, ED Summary, MAR, Recent Results and Cardiac Rhythm SR was reviewed with the receiving nurse. Lines:  
Quad Lumen 08/30/20 Right Subclavian (Active) Central Line Being Utilized Yes 08/30/20 2330 Site Assessment Clean, dry, & intact 08/30/20 2330 Dressing Status Clean, dry, & intact 08/30/20 2330 Dressing Type Transparent 08/30/20 2330 Positive Blood Return (Medial Site) Yes 08/30/20 2330 Positive Blood Return (Lateral Site) Yes 08/30/20 2330 Positive Blood Return (Site #3) Yes 08/30/20 2330 Positive Blood Return (Site #4) Yes 08/30/20 2330 Peripheral IV 08/30/20 Left Forearm (Active) Site Assessment Clean, dry, & intact 08/30/20 2010 Phlebitis Assessment 0 08/30/20 2010 Infiltration Assessment 4 08/30/20 2010 Dressing Status Clean, dry, & intact 08/30/20 2010 Hub Color/Line Status Pink 08/30/20 2010 Peripheral IV 08/30/20 Right Wrist (Active) Site Assessment Clean, dry, & intact 08/30/20 2026 Phlebitis Assessment 0 08/30/20 2026 Infiltration Assessment 0 08/30/20 2026 Dressing Status Clean, dry, & intact 08/30/20 2026 Dressing Type 4 X 4;Transparent 08/30/20 2026 Hub Color/Line Status Pink;Patent; Flushed 08/30/20 2026 Peripheral IV 08/30/20 Left Antecubital (Active) Site Assessment Clean, dry, & intact 08/30/20 2026 Phlebitis Assessment 0 08/30/20 2026 Infiltration Assessment 0 08/30/20 2026 Dressing Status Clean, dry, & intact 08/30/20 2026 Hub Color/Line Status Pink 08/30/20 2026 Opportunity for questions and clarification was provided. Patient transported with: 
 Monitor Registered Nurse Respiratory Therapist

## 2020-08-31 NOTE — FORENSIC NURSE
FNE consulted. FNE obtained photographs. FNE unable to obtain history from patient. FNE will call [facility] to find out if there is a next of kin or POA and also to obtain a history. Forensics to continue to follow-up. MOONE informed Albert Martin RN to contact MOONE if there is any change in the patient or if there are any questions or concerns.

## 2020-08-31 NOTE — H&P
[facility] 
 
ICU Intensivist- Critical Care Progress Note Name: Charlane Shone : 1960 MRN: [id number] Admit: 2020  8:06 PM   
 
Diagnosis:  
 
Principal Problem: 
  Septic shock ([facility] [address] Problem List: 
2020: Septic shock ([facility] [address] ICU Comprehensive Plan of Care:  
 
Plans for this Shift: 1. Neurological - 
 
-GCS 6 T 
-Continue IV fentanyl and propofol for analgesia/sedation 
-Patient received 2 mg IV Versed for agitation 
-Patient has history of CVA with left-sided hemiparesis 2. Cardiovascular -septic shock 
 
-Patient has life-threatening septic shock was started on IV norepinephrine drip, goal is to maintain systolic blood pressure greater than 90, map greater than 65 
-Continue to monitor vital signs every 15 minutes 
-Wean pressors as tolerated 
-Patient given 2 L IV crystalloid fluid bolus for septic shock 
-Central line placed with no complication 
-BNP pending 
-Continue DVT prophylaxis 
-EKG ordered for a.m. 
-Cardiac enzymes negative 
-Lactic acid 1.8 3. Pulmonary -acute hypoxic respiratory failure 
 
-Patient was stated to be an acute hypoxic respiratory failure secondary to drug administration with tramadol/trazodone 
-Patient was endotracheally intubated by emergency room physician 
-Chest x-ray shows adequate ET tube placement 
-Oxygen saturation 100% on the monitor 
-Goal is to liberate from mechanical ventilation if possible 
-Goal is maintain oxygen saturation greater than 90% 
-Chest x-ray and ABG in a.m. 
-Postintubation ABG showed pH 7.33, CO2 42.9, PO2 184, bicarbonate 22.6 
-Current ventilator settings are assist control, tidal volume 550, PEEP 10, rate 20 
 
4. GI -  
 
-OG tube placed 
-Tube feedings to be started tomorrow if patient cannot be extubated 
-Continue GI prophylaxis 
-Continue bowel regimen 
-Continue PRN Zofran for any nausea 5.  -acute renal failure -Creatinine 4.98, BUN 91. Patient appears to be in acute renal failure. 
-We will consult nephrology if patient's urine output decreases 
-Potassium 5.5, will continue to monitor potassium levels and treat with hyperkalemia protocol if needed, patient to receive p.o. Kayexalate 
-Maintain Rivera catheter 
-Continue to monitor intake and output 6. Endocrine -  
 
-Unknown if patient has diabetic history 
-Continue every 6 hour Accu-Cheks 
-Goal is to maintain blood glucose level 110-140 
-Continue hypoglycemia protocol 
-Monitor hemoglobin A1c 
 
7. Hematology/oncology -  
 
-Hemoglobin 15.3, hematocrit 46.9 
-No signs of bleeding 
-Patient appears to be intravascularly depleted 
-Platelets 977 
-CBC in a.m. 
 
8.  ID -sepsis/leukocytosis 
 
-Patient has profound septic shock and WBCs are 21.6 
-Patient received IV Rocephin and azithromycin in emergency room however patient is IV antibiotic therapy has been increased to Zosyn and vancomycin 
-We will de-escalate antibiotics as appropriate pending cultures 
-Urinalysis negative for any acute processes 
-Afebrile 
-CBC in a.m. Disposition: Patient is acutely ill. Continue with plan is stated above. Lab work reviewed. Patient seen and examined bedside by critical care services August 31, 2020 Subjective:  
 
Progress Note:  
8/31/2020  
12:18 AM  
 
Reason for ICU Admission:  
Septic shock ([address] Acute hypoxic respiratory failure Acute renal failure Leukocytosis Hyperkalemia History seizures HPI: Patient Cherry Benítez is a 58-year-old male seen and examined today by critical care services due to initial complaints of hypotension. Patient has past medical history for eplepsia, Segovia sarcoma, hepatitis C, seizures. When patient arrived to the emergency room he was found to be in life-threatening septic shock and acute hypoxic respiratory failure.   Patient was a transfer from [facility] due to hypotension with systolic blood pressure in the 80s and oxygen saturation in the 80s which required endotracheal intubation by emergency room physician due to acute hypoxic respiratory failure. Endotracheal intubation by emergency room physician due to acute hypoxic respiratory failure. Patient was started on IV norepinephrine drip due to life-threatening hypotension and was given IV antibiotic therapies Zosyn/vancomycin for IV antibiotic coverage. WBC is 21.6. Lactic acid 1.8. Platelets 128. Hemoglobin 15.3/hematocrit 46.9. Patient appears to be intravascularly depleted. Patient received 2 L IV fluid crystalloid bolus and was started on normal saline at 75 cc/h. Patient is nonoliguric with acute renal failure and Rivera catheter was placed. Creatinine 4.98, BUN 91. Will consult nephrology if needed. Cardiac enzymes are negative. Urinalysis negative. Patient was given p.o. Kayexalate via NG tube for potassium 5.5. Right subclavian central line placed with no complications. Plan is to continue ICU support. Past Medical History:  
 
 has a past medical history of Epilepsia [facility]), Segovia sarcoma ([facility] [address] Hepatitis C, and Seizures ([facility] [address] Past Surgical History:  
 
 has a past surgical history that includes hx orthopaedic and hx appendectomy. Current Medications:  
 
Current Facility-Administered Medications Medication Dose Route Frequency  Vancomycin Pharmacy Dosing   Other PRN  
 rocuronium 10 mg/mL injection  0.9% sodium chloride infusion  75 mL/hr IntraVENous CONTINUOUS  propofol (DIPRIVAN) 10 mg/mL infusion  0-50 mcg/kg/min IntraVENous TITRATE  piperacillin-tazobactam (ZOSYN) 3.375 g in 0.9% sodium chloride (MBP/ADV) 100 mL  3.375 g IntraVENous Q8H  
 docusate (COLACE) 50 mg/5 mL oral liquid 100 mg  100 mg Oral BID  heparin (porcine) injection 5,000 Units  5,000 Units SubCUTAneous Q8H  piperacillin-tazobactam (ZOSYN) 3.375 g in 0.9% sodium chloride (MBP/ADV) 100 mL  3.375 g IntraVENous ONCE  
 sodium chloride (NS) flush 5-40 mL  5-40 mL IntraVENous Q8H  
 sodium chloride (NS) flush 5-40 mL  5-40 mL IntraVENous PRN  
 acetaminophen (TYLENOL) tablet 650 mg  650 mg Oral Q6H PRN Or  
 acetaminophen (TYLENOL) suppository 650 mg  650 mg Rectal Q6H PRN  
 ondansetron (ZOFRAN) injection 4 mg  4 mg IntraVENous Q4H PRN  
 famotidine (PF) (PEPCID) 20 mg in 0.9% sodium chloride 10 mL injection  20 mg IntraVENous DAILY  NOREPINephrine (LEVOPHED) 8 mg in 5% dextrose 250mL (32 mcg/mL) infusion  0.5-30 mcg/min IntraVENous TITRATE  albuterol-ipratropium (DUO-NEB) 2.5 MG-0.5 MG/3 ML  3 mL Nebulization Q4H PRN  
 fentaNYL (PF) 1,500 mcg/30 mL (50 mcg/mL) infusion  0-200 mcg/hr IntraVENous TITRATE  vancomycin (VANCOCIN) 1500 mg in  ml infusion  1,500 mg IntraVENous ONCE Current Outpatient Medications Medication Sig  
 oxyCODONE IR (ROXICODONE) 5 mg immediate release tablet Take 1 Tab by mouth every six (6) hours as needed for Pain for 15 doses.  ibuprofen (MOTRIN) 400 mg tablet Take 1 Tab by mouth every six (6) hours as needed for Pain. Allergies/Social/Family History: No Known Allergies Social History Tobacco Use  Smoking status: Current Every Day Smoker Packs/day: 1.00 Substance Use Topics  Alcohol use: Yes Comment: 12 pack every 2 days History reviewed. No pertinent family history. Review of Systems:  
 
Review of systems not obtained due to patient factors. Patient intubated and sedated Objective:  
Vital Signs: 
Visit Vitals /60 Pulse 78 Temp 99.1 °F (37.3 °C) Resp 18 Ht 5' 7\" (1.702 m) Wt 69.3 kg (152 lb 12.5 oz) SpO2 100% BMI 23.93 kg/m² O2 Flow Rate (L/min): 15 l/min O2 Device: Ventilator, Endotracheal tube Temp (24hrs), Av.4 °F (37.4 °C), Min:99.1 °F (37.3 °C), Max:99.7 °F (37.6 °C) Intake/Output: No intake or output data in the 24 hours ending 08/31/20 0018 Physical Exam: 
 
General:   Not alert, severe distress, does not appear stated age. Eyes:   Conjunctivae/corneas clear. PERRL, EOMs intact. Ears:   Normal external ear canals bilaterally. Nose:   No drainage or sinus tenderness. Mouth/Throat:  Dry mucous membranes. Dentition normal.   
Neck:  Symmetrical, trachea midline, no JVD or carotid bruit. Back:    No CVA tenderness to percussion. Lungs:    Clear to auscultation bilaterally. Heart:   Tachycardia. S1, S2 normal, without murmur, click, rub or gallop. Abdomen:    Normoactive bowel sounds. Soft, non-tender, no masses or organomegaly. Extremities:  Atraumatic, no cyanosis or edema. Vascular:  Pulses 2+ and symmetric UE/LE bilat Skin:  Normal color, non-diaphoretic, positive capillary refill (less than 4 seconds). No rashes or lesions. Lymph nodes:  No Lymphadenopathy. Neurologic:  CN II-XII intact. Normal strength. LABS AND  DATA: Personally reviewed Recent Labs 08/30/20 2027 WBC 21.6* HGB 15.3 HCT 46.9  Recent Labs 08/30/20 2027   
K 5.5*  
 CO2 24 BUN 91* CREA 4.98* * CA 9.4 Recent Labs 08/30/20 2027 AP 57  
TP 7.3 ALB 2.6*  
GLOB 4.7* No results for input(s): INR, PTP, APTT, INREXT in the last 72 hours. Recent Labs 08/30/20 2118 PHI 7.33* PCO2I 42.9 PO2I 184* FIO2I 100 Recent Labs 08/30/20 2027 TROIQ <0.05 Ventilator Settings: 
Mode Rate Tidal Volume Pressure FiO2 PEEP Assist control, Volume control   550 ml    80 %(weaned per protocol after ABG) 10 cm H20 Peak airway pressure:     
Minute ventilation: 11.8 l/min MEDS: Reviewed RADIOLOGY: 
Xr Abd (kub) Result Date: 8/30/2020 IMPRESSION: An OG tube terminates in appropriate position in the stomach. There is retrocardiac airspace disease.  The stomach, small bowel, and colon are distended with gas. Xr Chest [facility] Result Date: 8/30/2020 IMPRESSION: 1. Retrocardiac airspace disease. 2. ET tube and NG tube in appropriate position. Assessment:  
 
Hospital Problems  Never Reviewed Codes Class Noted POA Septic shock (HCC) ICD-10-CM: A41.9, R65.21 ICD-9-CM: 038.9, 785.52, 995.92  8/30/2020 Unknown Multidisciplinary Rounds Completed:  Pending ABCDEF Bundle/Checklist 
Pain Medications: Fentanyl Target RASS: -4 - Deep Sedation - No response to voice, but movement or eye opening Sedation Medications: Propofol and Fentanyl CAM-ICU:  Negative Mobility: Bedrest 
PT/OT: N/A Restraints: Soft wrist restraints Discussed Plan of Care (goals of care): Pending Addressed Code Status: Full Code CARDIOVASCULAR Cardiac Gtts: Norepinephrine SBP Goal of: > 90 mmHg MAP Goal of: > 65 mmHg Transfusion Trigger (Hgb): <7 g/dL RESPIRATORY Vent Goals:  
Chlorhexidine Optimize PEEP/Ventilation/Oxygenation Head of bed > 30 degrees DVT Prophylaxis (if no, list reason): SCD's or Sequential Compression Device and Heparin SPO2 Goal: > 92% Pulmonary toilet: Duo-Nebs GI/ Rivera Catheter Present: Yes GI Prophylaxis: Pepcid (famotidine) Nutrition: Pending IVFs: Normal saline at 75 cc/h Bowel Movement: Yes Bowel Regimen: Docusate (Colace) Insulin: Continue insulin sliding scale protocol ANTIBIOTICS Antibiotics: 
Vancomycin Zosyn T/L/D Tubes: ETT and Orogastric Tube Lines: Peripheral IV and [employer] Drains: Rivera Catheter SPECIAL EQUIPMENT None DISPOSITION Stay in ICU CRITICAL CARE CONSULTANT NOTE I provided a face-to-face bedside physician/patient encounter, greater than the usual and customary amount normally needed, due to the high complexity of medical decision-making required. I reviewed and interpreted patient data including clinical events, labs, images, vital signs, I/O's, and examined patient. I have actively participated in multi-disciplinary discussions (nursing staff, radiology, laboratory) regarding the case in formulating an optimal therapeutic plan, and effecting a management strategy for this patient. NOTE OF PERSONAL INVOLVEMENT IN CARE This patient has a high probability of imminent, clinically significant deterioration, which requires the highest level of preparedness to intervene urgently. I participated in the decision-making and personally managed, or directed the management of, a myriad of life and organ supporting interventions which required my frequent-interval clinical reassessments, in order to treat or prevent imminent deterioration. I personally spent 45 minutes of critical care time. This is time spent at this critically ill patient's bedside actively involved in patient care as well as the coordination of care and discussions with the patient's family. This does not include any procedural time which has been billed separately. Eulalia Alonso MSN, BSN, BS, FNP-BC, NP-C, CCRN-CMC Staff Intensivist Nurse Practitioner [facility] Critical Care 8/31/2020

## 2020-08-31 NOTE — PROCEDURES
[facility] Procedure Note - Central Venous Access:  
Performed by Mallissa Kayser, NP 
 
Obtained emergent Consent. Immediately prior to the procedure, the patient was reevaluated and found suitable for the planned procedure and any planned medications. Immediately prior to the procedure a time out was called to verify the correct patient, procedure, equipment, staff, and marking as appropriate. The site was prepped with ChloraPrep. Using Seldinger technique a Triple Lumen CVC was placed in the Right, Subclavian Vein via direct cannulation with 1 number of attempts for Monitoring, Blood Drawing and IV Access. Ultrasound Guidance was utilized. There was good blood return. The following complications were encountered: None. A follow-up chest x-ray was ordered post procedure. The procedure was tolerated well.

## 2020-08-31 NOTE — ED NOTES
This RN overrode the \"RSI kit\" from the xis under the patient, ketamine, succinylcholine, etomidate, and rocuronium were dispensed. MD ordered etomidate and succinylcholine. This RN returned the ketamine to the [facility] and walked the rocuronium back to the pharmacy to be returned and replaced in the refrigerator.

## 2020-08-31 NOTE — ED TRIAGE NOTES
PT arrives via EMS from [facility] care d/t AMS, increasing SOB that has gotten worse over the past 12 hours. PT's BP for EMS was 64/60. History of CVA 2 weeks ago and history of aspiration. Pt is 83% on a nonrebreather.

## 2020-08-31 NOTE — PROGRESS NOTES
BART 
Patient presented to the ED form [facility] for AMS, shortness of breath and hypotension. RUR: 12% COVID pending Disposition: Return to SNF for completion of rehab Plan for utilizing home health:    NA   
 
PCP: First and Last name:  Dr Savanah Valencia 
 Name of Practice: [address] [place] [address] physician Are you a current patient: Yes/No:Yes Approximate date of last visit: unknown Can you participate in a virtual visit with your PCP: No 
                 
Current Advanced Directive/Advance Care Plan:  Daughter provided copy of AMD.  
                      
Care manager spoke with daughter DAVID JARQUIN [facility] [phone] to introduce self and explain role. Per daughter patient was at [address] [place] [address] for CVA from 8/15 -8/28 and was discharged to [facility] for rehab on 8/28. Patient lives in his own home and has a friend who stays with him. Patient does not drive, his CM with [facility] [phone] sets up transport for patient's appointments. Per daughter patient has a walker , but no previous HH needs. She confirmed his PCP to be Dr Savanah Valencia at [address] [place] [address] she is not sure what pharmacy he uses. CM confirmed his address and insurance information to be correct on the demographic sheet. Care manager will follow for transitions of care. Laya Plasencia RN,CRM Care Management Interventions PCP Verified by CM: Yes(Dr Liban Ward) Transition of Care Consult (CM Consult): (Patient at [facility] for rehab s/p CVA) MyChart Signup: No 
Discharge Durable Medical Equipment: No 
Physical Therapy Consult: No 
Occupational Therapy Consult: No 
Speech Therapy Consult: No 
Current Support Network: (Daughter DIVINE SAVIOR [facility] [phone], Kay Showers [phone]) Confirm Follow Up Transport: (will need BLS transport)

## 2020-08-31 NOTE — PROGRESS NOTES
Admission Medication Reconciliation: 
 
Information obtained from:  Medication List from [facility] RxQuery data available¹:  YES Comments/Recommendations: Patient presenting from [facility] where he currently resides. Utilized current medication list from [facility] to update PTA meds/review patient's allergies. 1)  Medication changes (since last review): Added - All current medications added Adjusted 
- None Removed - Ibuprofen - Oxycodone 2)  Per [facility]- hydrocodone listed as allergy, with rxn listed as rash, nausea and vomiting. Allergies updated. ¹RxQuery pharmacy benefit data reflects medications filled and processed through the patient's insurance, however  
this data does NOT capture whether the medication was picked up or is currently being taken by the patient. Allergies:  Hydrocodone Significant PMH/Disease States:  
Past Medical History:  
Diagnosis Date Epilepsia ([facility] [address] Segovia sarcoma ([facility] [address] c4-c7 Hepatitis C Seizures ([facility] [address] Chief Complaint for this Admission: Chief Complaint Patient presents with Shortness of Breath Altered mental status Hypotension Prior to Admission Medications:  
Prior to Admission Medications Prescriptions Last Dose Informant Taking?  
acetaminophen (TYLENOL) 325 mg tablet   Yes Sig: Take 650 mg by mouth every four (4) hours as needed for Pain. albuterol (PROVENTIL VENTOLIN) 2.5 mg /3 mL (0.083 %) nebu   Yes Si.5 mg by Nebulization route every six (6) hours as needed for Wheezing. albuterol-ipratropium (DUO-NEB) 2.5 mg-0.5 mg/3 ml nebu   Yes Sig: 3 mL by Nebulization route every six (6) hours as needed for Wheezing or Other (O2 desats). amLODIPine (NORVASC) 10 mg tablet   Yes Sig: Take 10 mg by mouth daily. atorvastatin (LIPITOR) 40 mg tablet   Yes Sig: Take 40 mg by mouth nightly. calcium carbonate (OS-JOE) 500 mg calcium (1,250 mg) tablet   Yes Sig: Take 1 Tab by mouth three (3) times daily. diclofenac (VOLTAREN) 1 % gel   Yes Sig: Apply 2 g to affected area two (2) times a day. folic acid (FOLVITE) 1 mg tablet   Yes Sig: Take 1 mg by mouth daily. furosemide (LASIX) 20 mg tablet   Yes Sig: Take 20 mg by mouth daily. For edema  
guaiFENesin-codeine (guaiFENesin AC) 100-10 mg/5 mL solution   Yes Sig: Take 10 mL by mouth every four (4) hours as needed for Cough. guar gum (BENEFIBER) packet   Yes Sig: Take 1 Packet by mouth every eight (8) hours as needed for Diarrhea.  
heparin sodium,porcine (heparin, porcine,) 5,000 unit/mL injection   Yes Si,000 Units by SubCUTAneous route every eight (8) hours. lisinopriL (PRINIVIL, ZESTRIL) 10 mg tablet   Yes Sig: Take 10 mg by mouth daily. methocarbamoL (ROBAXIN) 500 mg tablet   Yes Sig: Take 1,000 mg by mouth every six (6) hours. ondansetron hcl (ZOFRAN) 4 mg tablet   Yes Sig: Take 4 mg by mouth every six (6) hours as needed for Nausea or Nausea or Vomiting. therapeutic multivitamin [facility]) tablet   Yes Sig: Take 1 Tab by mouth daily. thiamine HCL (B-1) 100 mg tablet   Yes Sig: Take 100 mg by mouth daily. traMADoL (ULTRAM) 50 mg tablet   Yes Sig: Take 50 mg by mouth every six (6) hours as needed for Pain.  
traMADoL (ULTRAM) 50 mg tablet   Yes Sig: Take 50 mg by mouth every six (6) hours. traZODone (DESYREL) 50 mg tablet   Yes Sig: Take 50 mg by mouth nightly. Facility-Administered Medications: None Please contact the main inpatient pharmacy with any questions or concerns at [phone] and we will direct you to the clinical pharmacist covering this patient's care while in-house.   
WILDA De Jesus

## 2020-08-31 NOTE — PROGRESS NOTES
[facility] 
 
ICU TEAM Progress Note Name: Ximena Guzman : 1960 MRN: [id number] Date: 2020 I Subjective:  
Progress Note: 2020 Reason for ICU Admission: Respiratory failure, septic shock Interval history: 49-year-old gentleman nursing home resident who recently was at [facility] after sustaining a large stroke left him with left hemiplegia. After few weeks of that incident he was transferred out to a nursing home where he was found poorly responsive after receiving pain medication. Transferred to our ER where we had to be intubated and was found to be hypotensive and septic shock pressors initiated as well as broad-spectrum antibiotic. Overnight Events:  
No acute event overnight, remained on mild sedation with propofol and fentanyl, no issue with mechanical ventilation. Remained on 10 of norepinephrine. Active Problem List:  
 
Problem List  Never Reviewed Codes Class ARF (acute renal failure) ([facility]) ICD-10-CM: N17.9 ICD-9-CM: 584.9 Leukocytosis ICD-10-CM: T23.226 ICD-9-CM: 288.60 Acute respiratory failure with hypoxemia ([facility]) ICD-10-CM: J96.01 
ICD-9-CM: 518.81   
   
 * (Principal) Septic shock ([facility]) ICD-10-CM: A41.9, R65.21 ICD-9-CM: 038.9, 785.52, 995.92 Past Medical History:  
 
 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 Past Surgical History:  
 
 has a past surgical history that includes hx orthopaedic and hx appendectomy. Home Medications:  
 
Prior to Admission medications Medication Sig Start Date End Date Taking? Authorizing Provider  
acetaminophen (TYLENOL) 325 mg tablet Take 650 mg by mouth every four (4) hours as needed for Pain. Yes Provider, Historical  
albuterol (PROVENTIL VENTOLIN) 2.5 mg /3 mL (0.083 %) nebu 2.5 mg by Nebulization route every six (6) hours as needed for Wheezing.    Yes Provider, Historical  
 amLODIPine (NORVASC) 10 mg tablet Take 10 mg by mouth daily. Yes Provider, Historical  
atorvastatin (LIPITOR) 40 mg tablet Take 40 mg by mouth nightly. Yes Provider, Historical  
calcium carbonate (OS-JOE) 500 mg calcium (1,250 mg) tablet Take 1 Tab by mouth three (3) times daily. Yes Provider, Historical  
diclofenac (VOLTAREN) 1 % gel Apply 2 g to affected area two (2) times a day. Yes Provider, Historical  
folic acid (FOLVITE) 1 mg tablet Take 1 mg by mouth daily. Yes Provider, Historical  
furosemide (LASIX) 20 mg tablet Take 20 mg by mouth daily. For edema   Yes Provider, Historical  
guaiFENesin-codeine (guaiFENesin AC) 100-10 mg/5 mL solution Take 10 mL by mouth every four (4) hours as needed for Cough. Yes Provider, Historical  
guar gum (BENEFIBER) packet Take 1 Packet by mouth every eight (8) hours as needed for Diarrhea. Yes Provider, Historical  
heparin sodium,porcine (heparin, porcine,) 5,000 unit/mL injection 5,000 Units by SubCUTAneous route every eight (8) hours. Yes Provider, Historical  
albuterol-ipratropium (DUO-NEB) 2.5 mg-0.5 mg/3 ml nebu 3 mL by Nebulization route every six (6) hours as needed for Wheezing or Other (O2 desats). Yes Provider, Historical  
lisinopriL (PRINIVIL, ZESTRIL) 10 mg tablet Take 10 mg by mouth daily. Yes Provider, Historical  
methocarbamoL (ROBAXIN) 500 mg tablet Take 1,000 mg by mouth every six (6) hours. Yes Provider, Historical  
therapeutic multivitamin (THERAGRAN) tablet Take 1 Tab by mouth daily. Yes Provider, Historical  
ondansetron hcl (ZOFRAN) 4 mg tablet Take 4 mg by mouth every six (6) hours as needed for Nausea or Nausea or Vomiting. Yes Provider, Historical  
thiamine HCL (B-1) 100 mg tablet Take 100 mg by mouth daily. Yes Provider, Historical  
traMADoL (ULTRAM) 50 mg tablet Take 50 mg by mouth every six (6) hours as needed for Pain.    Yes Provider, Historical  
 traMADoL (ULTRAM) 50 mg tablet Take 50 mg by mouth every six (6) hours. Yes Provider, Historical  
traZODone (DESYREL) 50 mg tablet Take 50 mg by mouth nightly. Yes Provider, Historical  
 
 
Allergies/Social/Family History: Allergies Allergen Reactions  Hydrocodone Rash and Nausea and Vomiting Social History Tobacco Use  Smoking status: Current Every Day Smoker Packs/day: 1.00 Substance Use Topics  Alcohol use: Yes Comment: 12 pack every 2 days History reviewed. No pertinent family history. Review of Systems:  
 
Not able to obtain as he is sedated intubated Objective:  
Vital Signs: 
Visit Vitals /70 Pulse 92 Temp 98.9 °F (37.2 °C) Resp 18 Ht 5' 7\" (1.702 m) Wt 73.6 kg (162 lb 4.1 oz) SpO2 100% BMI 25.41 kg/m² O2 Flow Rate (L/min): 15 l/min O2 Device: Endotracheal tube, Ventilator Temp (24hrs), Av.4 °F (37.4 °C), Min:98.6 °F (37 °C), Max:101.3 °F (38.5 °C) Intake/Output:  
 
Intake/Output Summary (Last 24 hours) at 2020 1517 Last data filed at 2020 1400 Gross per 24 hour Intake 3345.77 ml Output 2875 ml Net 470.77 ml Physical Exam: 
 
General:   Sedated intubated, well developed, appears stated age Eyes:  Sclera anicteric. Pupils equally round and reactive to light. Mouth/Throat: Mucous membranes normal, ET tube Neck: Supple, right subclavian central line Lungs:   Clear to auscultation bilaterally, good effort CV:  Regular rate and rhythm,no murmur, click, rub or gallop Abdomen:   Soft, non-tender. bowel sounds normal. non-distended Extremities: No cyanosis or edema Skin: Skin color, texture, turgor normal. no acute rash or lesions Lymph nodes: Cervical and supraclavicular normal  
Musculoskeletal: No swelling or deformity Lines/Devices:  Intact, no erythema, drainage or tenderness Psych:  Sedated intubated mechanical ventilation, open eyes to sternal rub, some movement on the right side to painful stimuli despite sedation LABS AND  DATA: Personally reviewed Recent Labs 08/31/20 
0220 08/30/20 2027 WBC 20.7* 21.6* HGB 13.5 15.3 HCT 40.7 46.9  335 Recent Labs 08/31/20 
8453 08/30/20 2027 * 140  
K 4.1 5.5*  
* 107 CO2 21 24 BUN 78* 91* CREA 3.19* 4.98* * 104* CA 7.7* 9.4 MG 2.6*  --   
 
Recent Labs 08/31/20 
8886 08/30/20 2027 AP 56 57  
TP 5.9* 7.3 ALB 2.1* 2.6*  
GLOB 3.8 4.7* Recent Labs 08/31/20 
2891 INR 1.0 PTP 10.4 Recent Labs 08/31/20 
0316 08/30/20 
2118 PHI 7.32* 7.33* PCO2I 37.5 42.9 PO2I 194* 184* FIO2I 70 100 Recent Labs 08/30/20 2027 TROIQ <0.05 Hemodynamics:  
PAP:   CO:    
Wedge:   CI:    
CVP:    SVR:    
  PVR:    
 
Ventilator Settings: 
Mode Rate Tidal Volume Pressure FiO2 PEEP Assist control, Volume control   550 ml    60 % 8 cm H20 Peak airway pressure: 28 cm H2O Minute ventilation: 10.2 l/min MEDS: Reviewed Chest X-Ray: CXR Results  (Last 48 hours) 08/31/20 0453  XR CHEST PORT Final result Impression:  IMPRESSION: No interval change. Narrative:  INDICATION: Shortness of breath, altered mental status, hypotension, septic  
shock. Portable AP semiupright view of the chest.  
   
Direct comparison made to prior chest x-ray dated August 30, 2020. Cardiomediastinal silhouette is stable. Tubes and lines are unchanged in  
position. There is very mild left basilar atelectasis. Right lung is clear. No  
pleural fluid is visualized. There is no pneumothorax. Chronic right-sided rib  
fracture deformities are noted. 08/30/20 2320  XR CHEST PORT Final result Impression:  IMPRESSION:   
1. Right subclavian central line terminates in the brachiocephalic vein. Evaluation for pneumothorax is insensitive for supine technique. 2. Retrocardiac airspace disease. Narrative:  PORTABLE CHEST RADIOGRAPH/S: 8/30/2020 11:20 PM  
   
INDICATION: Central line placement. COMPARISON: 8/30/2020, 10/23/2009. TECHNIQUE: Portable frontal supine radiograph/s of the chest.  
   
FINDINGS:   
A right subclavian central line terminates in the right brachiocephalic vein. Evaluation for pneumothorax is insensitive with supine technique. An ET tube and  
NG tube are in appropriate position. There is retrocardiac airspace disease and  
atelectasis in the left costophrenic angle. The right lung is clear. The central  
airways are patent. There are multiple, old, bilateral rib fractures and an old,  
ununited, right clavicle fracture. Post right shoulder replacement. 08/30/20 2129  XR CHEST PORT Final result Impression:  IMPRESSION:   
1. Retrocardiac airspace disease. 2. ET tube and NG tube in appropriate position. Narrative:  PORTABLE CHEST RADIOGRAPH/S: 8/30/2020 9:29 PM  
   
INDICATION: Intubation. COMPARISON: 10/23/2009, 10/21/2009 TECHNIQUE: Portable frontal semiupright radiograph/s of the chest.  
   
FINDINGS:   
An ET tube and NG tube are in appropriate position. There is retrocardiac  
airspace disease. The lungs are otherwise clear. The central airways are patent. No pneumothorax or pleural effusion. There are multiple old right rib fractures. Post right shoulder replacement,  
left glenohumeral osteoarthritis. An ununited right clavicle fracture has healed  
partially healed, with a pseudoarticulation between the 2 major fragments. X-ray reviewed and agree with report as above Assessment and Plan: Septic shock:  Acute hypoxic hypercapnic respiratory failure: 
 COVID-19 under investigation  Acute kidney injury:  Recent history of ischemic stroke with residual left hemiplegia Continue vancomycin and Zosyn, follow-up on culture and sensitivity. Wean off norepinephrine as tolerated  Decrease tidal volume to 480, decrease rate to 16. Wean FiO2 as tolerated to keep saturation above 92%. We will attempt spontaneous breathing trial tomorrow morning  Repeat COVID-19 testing, patient is high risk given his recent hospitalization and nursing home stay as well as his respiratory symptoms with fever and x-ray finding of bilateral congestion.  Kidney function seems to be improving, good urine output. Continue gentle hydration  Start tube feeding DVT and GI prophylaxis. Ventilator bundle. DISPOSITION Stay in ICU CRITICAL CARE CONSULTANT NOTE I had a face to face encounter with the patient, reviewed and interpreted patient data including clinical events, labs, images, vital signs, I/O's, and examined patient. I have discussed the case and the plan and management of the patient's care with the consulting services, the bedside nurses and the respiratory therapist.   
 
NOTE OF PERSONAL INVOLVEMENT IN CARE This patient has a high probability of imminent, clinically significant deterioration, which requires the highest level of preparedness to intervene urgently. I participated in the decision-making and personally managed or directed the management of the following life and organ supporting interventions that required my frequent assessment to treat or prevent imminent deterioration. I personally spent 40 minutes of critical care time. This is time spent at this critically ill patient's bedside actively involved in patient care as well as the coordination of care and discussions with the patient's family. This does not include any procedural time which has been billed separately. Micheal Mac M.D. Staff Intensivist/Pulmonologist 
[facility] 8/31/2020

## 2020-08-31 NOTE — PROGRESS NOTES
2345: TRANSFER - IN REPORT: 
 
Verbal report received from [address] [place] [address] Rn(name) on Rakel Haney  being received from ER 
(unit) for routine progression of care Report consisted of patients Situation, Background, Assessment and  
Recommendations(SBAR). Information from the following report(s) SBAR, Kardex, ED Summary, Procedure Summary, MAR, Recent Results and Cardiac Rhythm NSR was reviewed with the receiving nurse. Opportunity for questions and clarification was provided. Assessment completed upon patients arrival to unit and care assumed. 0025: Patient arrive to ICU from ER. Admission assessment completed per flowsheet. Patient moving bilateral upper extremities saponaceously, bilateral lower extremities withdraw from pain. Pupils are unqual, R 2 and L 3, both react to light. Ezequiel Reza, NP made aware, believes this is residual from previous stoke. Will continue to monitor. Patient has a cough and gag reflex. Primary Nurse Fidel Loredo, RN and Jj Chu RN performed a dual skin assessment on this patient Impairment noted- see wound doc flow sheet 
-bilateral heels and ankles red but negrita 
-purple non-blanchable sacrum with 2 blisters -ecchymosis scattered on abdomen and legs Jay score is 12 
 
0150: CHADWICK Wright at bedside to place arterial line. 0300: This Rn and CHERRY Jackson, PCT noticed multiple bruises on patient's body. 1 large circular bruise was noted to patient's R medial calf. 1 large palm sized bruise with 4 distinct pressure points around it noted to L side rib cage. Rn informed charge nurse and nursing supervisors. Forensic nurse paged at this time. 12Sicrispin Medina with [facility], regarding patient. Rn will be up shortly to assess patient. 0330Thlucretia Morelos Forensic Rn, at bedside to assess patient. 0400: Reassessment completed per flowsheet. No changes from previous assessment. 0730: Bedside and Verbal shift change report given to Fermin Gonzalez (oncoming nurse) by Lore Sullivan (offgoing nurse). Report included the following information SBAR, Kardex, ED Summary, Procedure Summary, MAR, Recent Results and Cardiac Rhythm NSR.

## 2020-08-31 NOTE — FORENSIC NURSE
FRANK filed APS report with Alfa Richardson. [facility] Hotline, Referral # [id number].

## 2020-08-31 NOTE — PROGRESS NOTES
08/30/20 2040 Patient Observations Pulse (Heart Rate) 100 Resp Rate 20  
O2 Sat (%) 93 % ETCO2 (mmHg) 32 mmHg Airway - Endotracheal Tube 08/30/20 Oral  
Placement Date/Time: 08/30/20 2035   Number of Attempts: 1  Inserted By: Dr Best Edmond  Present on Admission/Arrival: No  Location: Oral  Placement Verified: Auscultation;BBS;Chest x-ray;EtCO2  Airway Types: Endotracheal, cuffed  Airway Tube Size: 7.5 mm  A... Insertion Depth (cm) 23 cm Line Jared Gumline Side Secured Right;Device Cuff Pressure  
(MLT) Site Assessment Clean, dry, & intact (new) Respiratory Respiratory (WDL) X Breath Sounds Bilateral Rhonchi Airway Clearance Suction ET Tube Sputum Method Obtained Endotracheal  
Sputum Amount Copious Sputum Color/Odor Green;Yellow Sputum Consistency Purulent Ventilator Initiate/Discontinue Ventilator Initiate Yes Bio-Med ID # [id number] Vent Settings FIO2 (%) 100 % CMV Rate Set 20 Back-Up Rate 20 Vt Set (ml) 550 ml PEEP/VENT (cm H2O) 10 cm H20  
I:E Ratio 1:2 Insp Flow (l/min) 60 l/min Flow Trigger 3 Ventilator Measurements Resp Rate Observed 20 Vt Exhaled (Machine Breath) (ml) 564 ml Vt Spont (ml) 417 ml Ve Observed (l/min) 11.8 l/min Plateau Pressure (cm H2O) 25 cm H2O  
MAP (cm H2O) 20 I:E Ratio Actual 1:2 Auto PEEP Observed (cm H2O) 0 cm H2O Static Compliance (ml/cm H20) 0 ml/cm H20 Airway Procedures  
$$ Airway Procedures Intubation (assist Dr Best Edmond) Vent Method/Mode Ventilation Method Conventional  
Ventilator Mode Assist control;Volume control Ventilator Mode ID [id number]  
$$ Ventilator Initial Initial Vent Day

## 2020-08-31 NOTE — ED NOTES
2019 - 30 etomidate given by Leena Saxena, RN 
2020 - 100 succinylcholine given by Arnoldo Jones 
2021 - Pt intubated by Dr. Cherilynn Olszewski. ET tube secured 23 @ [facility].

## 2020-08-31 NOTE — BH NOTES
Advance Care Planning Advance Care Planning Activator (Inpatient) Conversation Note Date of ACP Conversation: 08/31/20 Conversation Conducted withYarely Lassiter, [phone] ACP Activator: Bhavna Noe RN 
 
*When Decision Maker makes decisions on behalf of the incapacitated patient: Decision Maker is asked to consider and make decisions based on patient values, known preferences, or best interests. Health Care Decision Maker: 
 
Current Designated Health Care Decision Maker:   Primary Decision Maker: Elvie Aguayo - Daughter - [phone] Supplemental (Other) Decision Maker: Aileen Peoples - Other Non-relative - [phone] Care Preferences Ventilation: \"If you were in your present state of health and suddenly became very ill and were unable to breathe on your own, what would your preference be about the use of a ventilator (breathing machine) if it were available to you? \"  Yes I \"If your health worsens and it becomes clear that your chance of recovery is unlikely, what would your preference be about the use of a ventilator (breathing machine) if it were available to you? \" No 
 
 
Resuscitation \"CPR works best to restart the heart when there is a sudden event, like a heart attack, in someone who is otherwise healthy. Unfortunately, CPR does not typically restart the heart for people who have serious health conditions or who are very sick. \" \"In the event your heart stopped as a result of an underlying serious health condition, would you want attempts to be made to restart your heart Yes NOTE: If the patient has a valid advance directive AND now provides care preference(s) that are inconsistent with that prior directive, advise the patient to consider either: creating a new advance directive that complies with state-specific requirements; or, if that is not possible, orally revoking that prior directive in accordance with state-specific requirements, which must be documented in the EHR. Length of ACP Conversation in minutes:  15 
 
Conversation Outcomes: 
[x] ACP discussion completed 
[] Existing advance directive reviewed with patient; no changes to patient's previously recorded wishes 
 
 [] New Advance Directive completed 
 [] Portable Do Not Resuscitate prepared for Provider review and signature 
[] POLST/POST/MOLST/MOST prepared for Provider review and signature Follow-up plan:   
[] Schedule follow-up conversation to continue planning 
[] Referred individual to Provider for additional questions/concerns  
[] Advised patient/agent/surrogate to review completed ACP document and update if needed with changes in condition, patient preferences or care setting  
 
[] This note routed to one or more involved healthcare providers

## 2020-08-31 NOTE — PROGRESS NOTES
Comprehensive Nutrition Assessment Type and Reason for Visit: Initial, Consult Nutrition Recommendations/Plan:   
Osmolite 1.5 @ 40 ml/hr with 2 packets Prosource daily and 120 ml water flush q 4 hr [place] liquid MVI Nutrition Assessment:   
Pt admitted with Septic shock. PMHx: CVA, RA, OA, malignant tumor supraglottis, constipation. Sepsis with shock-requiring pressor. Acute hypoxic respiratory failure-intubated on admission. CASEY-improved with IVF. Ruling out COVID 19. Mr Graham Morales was on a mechanically altered diet with thin liquids PTA. Plan to start enteral nutrition support per intensivist. Per daughter pt appears to have lost some weight since being in the hospital (at [facility]). Does not know his UBW. Pt has been a drinker since she was a child but he has \"slowed down a lot\". Potassium WNL after receiving Kayexalate. BUN and creatinine remain elevated but trended down today. Continue to monitor. Corrected CA 9.2 mg/dl. Diprivan ordered for sedation; @ current rate of 10.4 ml/hr pt will receive 275 lipid calories per day. Recommended tube feeding: Osmolite 1.5 @ 40 ml/hr with 2 packets Prosource daily and 120 ml water flush q 4 hr. This will provide 960 ml, 1560 calories (1835 including Diprivan), 90 gm protein and 1570 ml free water (tube feeding/flush) per day. Malnutrition Assessment: 
Malnutrition Status:  Insufficient data-unclear weight history. Decreased PO intake since being hospitalized. History of alcohol abuse. Nutritionally Significant Medications:  
Colace, correction scale insulin, NS @ 75 ml/hr, Albumin, Kayexalate, Diprivan, Levophed Estimated Daily Nutrient Needs: 
Energy (kcal):  1990 [facility] 2003b, 73 kg) Protein (g):  88-95 (1.2-1.3g/kg) Fluid (ml/day):  1 ml/kcal 
 
Nutrition Related Findings:  Last BM PTA. Edema 1+ NP generalized and BUE and BLE. Wounds:  None Current Nutrition Therapies:  
Diet: NPO Anthropometric Measures: · Height:  5' 7\" (170.2 cm) · Current Body Wt:  73.6 kg (162 lb 4.1 oz) · Admission Body Wt:  152 lb 12.5 oz · Ideal Body Wt:   :  109.6 % · BMI Categories:  Overweight (BMI 25.0-29. 9) Wt Readings from Last 10 Encounters:  
08/31/20 73.6 kg (162 lb 4.1 oz) 12/27/12 77.1 kg (170 lb) Nutrition Diagnosis:  
· Inadequate oral intake related to   as evidenced by NPO or clear liquid status due to medical condition Nutrition Interventions:  
Food and/or Nutrient Delivery: Start tube feeding Nutrition Education and Counseling: No recommendations at this time Coordination of Nutrition Care: Continued inpatient monitoring, Interdisciplinary rounds Goals: Tolerate tube feeding at goal in next 1-2 days. Nutrition Monitoring and Evaluation:  
Food/Nutrient Intake Outcomes: Enteral nutrition intake/tolerance Physical Signs/Symptoms Outcomes: Biochemical data, Hemodynamic status, GI status, Fluid status or edema, Weight Discharge Planning: Too soon to determine Inez Patterson RD CNSC Contact: Perfect Serve

## 2020-09-01 NOTE — PROGRESS NOTES
Day #2 of Zosyn - Renal Dosing Update Indication:  Possible aspiration PNA Current regimen:  3.375 gm IV Q 12 hr Abx regimen: Zosyn + Vancomycin Recent Labs  
  20 
1110 20 
6491 20 
0220 20 
7972 20 WBC  --  23.3* 20.7*  --  21.6*  
CREA 0.69* 0.81  --  3.19* 4.98* BUN 19 25*  --  78* 91* Est CrCl: ~100 ml/min; UO: >1 ml/kg/hr Temp (24hrs), Av °F (37.2 °C), Min:97 °F (36.1 °C), Max:100.3 °F (37.9 °C) Cultures:  
 Blood: NGTD 
 Sputum: heavy normal massimo + moderate yeast, final 
 
Plan: Given the major improvement in the patient's Scr, the dose of Zosyn has been adjusted to 3.375 gm IV Q 8 hr per [facility] P&T Committee Protocol with respect to renal function. Pharmacy will continue to monitor patient daily and will make dosage adjustments based upon changing renal function.

## 2020-09-01 NOTE — PROGRESS NOTES
Renal Dosing/Monitoring Medication: Famotidine Current regimen:  20 mg IV every 24 hr 
Recent Labs  
  09/01/20 
1110 09/01/20 
0311 08/31/20 
0218 CREA 0.69* 0.81 3.19* BUN 19 25* 78* Estimated CrCl:  ~100 ml/min Plan: Change to 20 mg IV every 12 hours per [facility] P&T Committee Protocol with respect to renal function. Pharmacy will continue to monitor patient daily and will make dosage adjustments based upon changing renal function.

## 2020-09-01 NOTE — PROGRESS NOTES
Problem: Ventilator Management Goal: *Adequate oxygenation and ventilation Outcome: Progressing Towards Goal 
Goal: *Patient maintains clear airway/free of aspiration Outcome: Progressing Towards Goal 
Goal: *Absence of infection signs and symptoms Outcome: Progressing Towards Goal 
Goal: *Normal spontaneous ventilation Outcome: Progressing Towards Goal 
  
Problem: Patient Education: Go to Patient Education Activity Goal: Patient/Family Education Outcome: Progressing Towards Goal 
  
Problem: Risk for Spread of Infection Goal: Prevent transmission of infectious organism to others Description: Prevent the transmission of infectious organisms to other patients, staff members, and visitors. Outcome: Progressing Towards Goal 
  
Problem: Patient Education:  Go to Education Activity Goal: Patient/Family Education Outcome: Progressing Towards Goal 
  
Problem: Non-Violent Restraints Goal: *Removal from restraints as soon as assessed to be safe Outcome: Progressing Towards Goal 
Goal: *No harm/injury to patient while restraints in use Outcome: Progressing Towards Goal 
Goal: *Patient's dignity will be maintained Outcome: Progressing Towards Goal 
Goal: *Patient Specific Goal (EDIT GOAL, INSERT TEXT) Outcome: Progressing Towards Goal 
Goal: Non-violent Restaints:Standard Interventions Outcome: Progressing Towards Goal 
Goal: Non-violent Restraints:Patient Interventions Outcome: Progressing Towards Goal 
Goal: Patient/Family Education Outcome: Progressing Towards Goal 
  
Problem: Falls - Risk of 
Goal: *Absence of Falls Description: Document Brooklynn Ruts Fall Risk and appropriate interventions in the flowsheet. Outcome: Progressing Towards Goal 
Note: Fall Risk Interventions: 
Mobility Interventions: Communicate number of staff needed for ambulation/transfer, Strengthening exercises (ROM-active/passive) Mentation Interventions: Adequate sleep, hydration, pain control, Evaluate medications/consider consulting pharmacy, More frequent rounding, Reorient patient, Room close to nurse's station, Increase mobility, Update white board, Toileting rounds Medication Interventions: Evaluate medications/consider consulting pharmacy Elimination Interventions: Toileting schedule/hourly rounds History of Falls Interventions: Evaluate medications/consider consulting pharmacy Problem: Patient Education: Go to Patient Education Activity Goal: Patient/Family Education Outcome: Progressing Towards Goal 
  
Problem: Pressure Injury - Risk of 
Goal: *Prevention of pressure injury Description: Document Jay Scale and appropriate interventions in the flowsheet. Outcome: Progressing Towards Goal 
Note: Pressure Injury Interventions: 
Sensory Interventions: Assess changes in LOC, Assess need for specialty bed, Float heels, Keep linens dry and wrinkle-free, Maintain/enhance activity level, Minimize linen layers, Monitor skin under medical devices, Turn and reposition approx. every two hours (pillows and wedges if needed) Moisture Interventions: Absorbent underpads, Apply protective barrier, creams and emollients, Assess need for specialty bed, Internal/External urinary devices, Contain wound drainage, Maintain skin hydration (lotion/cream), Minimize layers Activity Interventions: Assess need for specialty bed, Pressure redistribution bed/mattress(bed type) Mobility Interventions: Assess need for specialty bed, Float heels, HOB 30 degrees or less, Pressure redistribution bed/mattress (bed type), Turn and reposition approx. every two hours(pillow and wedges) Nutrition Interventions: Document food/fluid/supplement intake Friction and Shear Interventions: Apply protective barrier, creams and emollients, Lift sheet, HOB 30 degrees or less, Minimize layers Problem: Patient Education: Go to Patient Education Activity Goal: Patient/Family Education Outcome: Progressing Towards Goal 
  
Problem: Nutrition Deficit Goal: *Optimize nutritional status Outcome: Progressing Towards Goal

## 2020-09-01 NOTE — PROGRESS NOTES
Day #2 of Vancomycin - Renal Dosing Update Indication:  Sepsis of unclear etiology, possible aspiration PNA Current regimen:  Based on levels, last dose: 1250 mg IV on  @ 0254 Abx regimen:  Zosyn + Vancomycin ID Following ?: NO 
Concomitant nephrotoxic drugs (requires more frequent monitoring): Vasopressors Frequency of BMP?: Daily through 9/3 Recent Labs  
  20 
1110 20 
7972 20 
0220 20 
6722 20 WBC  --  23.3* 20.7*  --  21.6*  
CREA 0.69* 0.81  --  3.19* 4.98* BUN 19 25*  --  78* 91* Est CrCl: ~100 ml/min; UO: >1 ml/kg/hr Temp (24hrs), Av °F (37.2 °C), Min:97 °F (36.1 °C), Max:100.3 °F (37.9 °C) Cultures:  
 Blood: NGTD 
 Sputum: heavy normal massimo + moderate yeast, final 
 
Goal trough = 15 - 20 mcg/mL Recent trough history (date/time/level/dose/action taken): 
 @ 0009 = 4.7 mcg/ml (drawn ~23 hrs post-dose), 1.25 gm IV x 1 dose given Plan: Given the major improvement in the patient's Scr, the dose of vancomycin will be preemptively adjusted to 1 gm IV Q 8 hr. Pharmacy will continue to monitor patient daily and will make dosage adjustments based upon changing renal function.

## 2020-09-01 NOTE — PROGRESS NOTES
1930: Bedside and Verbal shift change report given to [address] (oncoming nurse) by Melvi Eaton RN (offgoing nurse). Report included the following information SBAR, Kardex, ED Summary, Procedure Summary, Intake/Output, MAR, Recent Results, Cardiac Rhythm NSR and Alarm Parameters . 0730: Bedside and Verbal shift change report given to Fermin RN (oncoming nurse) by Matt Cooper RN (offgoing nurse). Report included the following information SBAR, Kardex, Intake/Output, MAR, Recent Results, Cardiac Rhythm NSR, Alarm Parameters  and Dual Neuro Assessment.

## 2020-09-01 NOTE — PROGRESS NOTES
Pharmacist Note - Vancomycin Dosing Therapy day 1 Indication: sepsis Current regimen: by levels (loading dose 8/31 @ 0106) A Random Level resulted at 4.7 mcg/mL which was obtained 23 hrs post-dose. Goal trough: 15 - 20 mcg/mL Plan: Will order 1250 mg x 1. Pharmacy will continue to monitor this patient daily for changes in clinical status and renal function.

## 2020-09-01 NOTE — PROGRESS NOTES
[facility] 
 
ICU TEAM Progress Note Name: Cristofer Ulloa : 1960 MRN: [id number] Date: 2020 I Subjective:  
Progress Note: 2020 Reason for ICU Admission: Respiratory failure, septic shock Interval history: 
56-year-old gentleman nursing home resident who recently was at [facility] after sustaining a large stroke left him with left hemiplegia. After few weeks of that incident he was transferred out to a nursing home where he was found poorly responsive after receiving pain medication. Transferred to our ER where we had to be intubated and was found to be hypotensive and septic shock pressors initiated as well as broad-spectrum antibiotic. Overnight Events:  
No acute event overnight. Able to go down on norepinephrine. Renal function improving. .  Thick secretions through the ET tube, wakes up and try to follow commands when off sedation but gait agitated with extensive coughing and dyssynchronous with the ventilator. Still spiking fever. Tube feeding started Active Problem List:  
 
Problem List  Never Reviewed Codes Class ARF (acute renal failure) ([facility]) ICD-10-CM: N17.9 ICD-9-CM: 584.9 Leukocytosis ICD-10-CM: O57.441 ICD-9-CM: 288.60 Acute respiratory failure with hypoxemia ([facility]) ICD-10-CM: J96.01 
ICD-9-CM: 518.81   
   
 * (Principal) Septic shock ([facility]) ICD-10-CM: A41.9, R65.21 ICD-9-CM: 038.9, 785.52, 995.92 Past Medical History:  
 
 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 Past Surgical History:  
 
 has a past surgical history that includes hx orthopaedic and hx appendectomy. Home Medications:  
 
Prior to Admission medications Medication Sig Start Date End Date Taking? Authorizing Provider  
acetaminophen (TYLENOL) 325 mg tablet Take 650 mg by mouth every four (4) hours as needed for Pain.    Yes Provider, Historical  
 albuterol (PROVENTIL VENTOLIN) 2.5 mg /3 mL (0.083 %) nebu 2.5 mg by Nebulization route every six (6) hours as needed for Wheezing. Yes Provider, Historical  
amLODIPine (NORVASC) 10 mg tablet Take 10 mg by mouth daily. Yes Provider, Historical  
atorvastatin (LIPITOR) 40 mg tablet Take 40 mg by mouth nightly. Yes Provider, Historical  
calcium carbonate (OS-JOE) 500 mg calcium (1,250 mg) tablet Take 1 Tab by mouth three (3) times daily. Yes Provider, Historical  
diclofenac (VOLTAREN) 1 % gel Apply 2 g to affected area two (2) times a day. Yes Provider, Historical  
folic acid (FOLVITE) 1 mg tablet Take 1 mg by mouth daily. Yes Provider, Historical  
furosemide (LASIX) 20 mg tablet Take 20 mg by mouth daily. For edema   Yes Provider, Historical  
guaiFENesin-codeine (guaiFENesin AC) 100-10 mg/5 mL solution Take 10 mL by mouth every four (4) hours as needed for Cough. Yes Provider, Historical  
guar gum (BENEFIBER) packet Take 1 Packet by mouth every eight (8) hours as needed for Diarrhea. Yes Provider, Historical  
heparin sodium,porcine (heparin, porcine,) 5,000 unit/mL injection 5,000 Units by SubCUTAneous route every eight (8) hours. Yes Provider, Historical  
albuterol-ipratropium (DUO-NEB) 2.5 mg-0.5 mg/3 ml nebu 3 mL by Nebulization route every six (6) hours as needed for Wheezing or Other (O2 desats). Yes Provider, Historical  
lisinopriL (PRINIVIL, ZESTRIL) 10 mg tablet Take 10 mg by mouth daily. Yes Provider, Historical  
methocarbamoL (ROBAXIN) 500 mg tablet Take 1,000 mg by mouth every six (6) hours. Yes Provider, Historical  
therapeutic multivitamin (THERAGRAN) tablet Take 1 Tab by mouth daily. Yes Provider, Historical  
ondansetron hcl (ZOFRAN) 4 mg tablet Take 4 mg by mouth every six (6) hours as needed for Nausea or Nausea or Vomiting. Yes Provider, Historical  
thiamine HCL (B-1) 100 mg tablet Take 100 mg by mouth daily.    Yes Provider, Historical  
 traMADoL (ULTRAM) 50 mg tablet Take 50 mg by mouth every six (6) hours as needed for Pain. Yes Provider, Historical  
traMADoL (ULTRAM) 50 mg tablet Take 50 mg by mouth every six (6) hours. Yes Provider, Historical  
traZODone (DESYREL) 50 mg tablet Take 50 mg by mouth nightly. Yes Provider, Historical  
 
 
Allergies/Social/Family History: Allergies Allergen Reactions  Hydrocodone Rash and Nausea and Vomiting Social History Tobacco Use  Smoking status: Current Every Day Smoker Packs/day: 1.00 Substance Use Topics  Alcohol use: Yes Comment: 12 pack every 2 days History reviewed. No pertinent family history. Review of Systems:  
 
Not able to obtain as he is sedated intubated Objective:  
Vital Signs: 
Visit Vitals /55 (BP 1 Location: Right arm, BP Patient Position: At rest) Pulse 94 Temp 97 °F (36.1 °C) Resp 21 Ht 5' 7\" (1.702 m) Wt 73.5 kg (162 lb) SpO2 100% BMI 25.37 kg/m² O2 Flow Rate (L/min): 15 l/min O2 Device: Endotracheal tube, Ventilator Temp (24hrs), Av °F (37.2 °C), Min:97 °F (36.1 °C), Max:100.3 °F (37.9 °C) Intake/Output:  
 
Intake/Output Summary (Last 24 hours) at 2020 1323 Last data filed at 2020 1200 Gross per 24 hour Intake 4109.47 ml Output 2815 ml Net 1294.47 ml Physical Exam: 
General:   Sedated intubated, well developed, appears stated age Eyes:  Sclera anicteric. Pupils equally round and reactive to light. Mouth/Throat: Mucous membranes normal, ET tube Neck: Supple, right subclavian central line Lungs:   Clear to auscultation bilaterally, good effort CV:  Regular rate and rhythm,no murmur, click, rub or gallop Abdomen:   Soft, non-tender. bowel sounds normal. non-distended Extremities: No cyanosis or edema Skin:  Sacral deep tissue injury, no open skin. Followed by wound care Lymph nodes: Cervical and supraclavicular normal  
 Musculoskeletal: No swelling or deformity Lines/Devices:  Intact, no erythema, drainage or tenderness Psych:  Sedated intubated mechanical ventilation, open eyes to sternal rub, some movement on the right side to painful stimuli despite sedation LABS AND  DATA: Personally reviewed Recent Labs  
  09/01/20 
0311 08/31/20 
0220 WBC 23.3* 20.7* HGB 12.2 13.5 HCT 37.5 40.7  335 Recent Labs  
  09/01/20 
1110 09/01/20 
0311 08/31/20 
9743 * 152* 146*  
K 3.7 3.0* 4.1 * 121* 117* CO2 28 26 21 BUN 19 25* 78* CREA 0.69* 0.81 3.19* GLU 83 87 147* CA 7.9* 7.9* 7.7* MG  --  2.2 2.6* PHOS  --  1.6*  --   
 
Recent Labs  
  09/01/20 
0311 08/31/20 
8400 AP 57 56  
TP 5.7* 5.9* ALB 2.1* 2.1*  
GLOB 3.6 3.8 Recent Labs  
  09/01/20 
0311 08/31/20 
4405 INR 1.1 1.0 PTP 11.2* 10.4 Recent Labs  
  09/01/20 
0438 08/31/20 
0316 PHI 7.38 7.32* PCO2I 46.2* 37.5 PO2I 208* 194* FIO2I 50 70 Recent Labs 08/30/20 2027 TROIQ <0.05 Hemodynamics:  
PAP:   CO:    
Wedge:   CI:    
CVP:    SVR:    
  PVR:    
 
Ventilator Settings: 
Mode Rate Tidal Volume Pressure FiO2 PEEP Pressure control   480 ml    40 % 5 cm H20 Peak airway pressure: 30 cm H2O Minute ventilation: 9.15 l/min MEDS: Reviewed Chest X-Ray: CXR Results  (Last 48 hours) 09/01/20 1154  XR CHEST PORT Final result Impression:  IMPRESSION:  
Interval increased hazy parenchymal opacity in the right lung most likely  
related to mild interstitial pulmonary edema. Narrative:  Clinical history: hypoxia INDICATION:   hypoxia COMPARISON: 8/31/2020 FINDINGS:  
AP portable upright view of the chest demonstrates a stable  cardiopericardial  
silhouette. Interval hazy parenchymal opacity in the right lung. ET tube, NG tube  
and central venous access catheter on the right unchanged. Patient is on a  
cardiac monitor. 08/31/20 0453  XR CHEST PORT Final result Impression:  IMPRESSION: No interval change. Narrative:  INDICATION: Shortness of breath, altered mental status, hypotension, septic  
shock. Portable AP semiupright view of the chest.  
   
Direct comparison made to prior chest x-ray dated August 30, 2020. Cardiomediastinal silhouette is stable. Tubes and lines are unchanged in  
position. There is very mild left basilar atelectasis. Right lung is clear. No  
pleural fluid is visualized. There is no pneumothorax. Chronic right-sided rib  
fracture deformities are noted. 08/30/20 2320  XR CHEST PORT Final result Impression:  IMPRESSION:   
1. Right subclavian central line terminates in the brachiocephalic vein. Evaluation for pneumothorax is insensitive for supine technique. 2. Retrocardiac airspace disease. Narrative:  PORTABLE CHEST RADIOGRAPH/S: 8/30/2020 11:20 PM  
   
INDICATION: Central line placement. COMPARISON: 8/30/2020, 10/23/2009. TECHNIQUE: Portable frontal supine radiograph/s of the chest.  
   
FINDINGS:   
A right subclavian central line terminates in the right brachiocephalic vein. Evaluation for pneumothorax is insensitive with supine technique. An ET tube and  
NG tube are in appropriate position. There is retrocardiac airspace disease and  
atelectasis in the left costophrenic angle. The right lung is clear. The central  
airways are patent. There are multiple, old, bilateral rib fractures and an old,  
ununited, right clavicle fracture. Post right shoulder replacement. 08/30/20 2129  XR CHEST PORT Final result Impression:  IMPRESSION:   
1. Retrocardiac airspace disease. 2. ET tube and NG tube in appropriate position. Narrative:  PORTABLE CHEST RADIOGRAPH/S: 8/30/2020 9:29 PM  
   
INDICATION: Intubation. COMPARISON: 10/23/2009, 10/21/2009 TECHNIQUE: Portable frontal semiupright radiograph/s of the chest.  
 FINDINGS:   
An ET tube and NG tube are in appropriate position. There is retrocardiac  
airspace disease. The lungs are otherwise clear. The central airways are patent. No pneumothorax or pleural effusion. There are multiple old right rib fractures. Post right shoulder replacement,  
left glenohumeral osteoarthritis. An ununited right clavicle fracture has healed  
partially healed, with a pseudoarticulation between the 2 major fragments. Assessment and Plan: Septic shock:  Acute hypoxic hypercapnic respiratory failure: 
 COVID-19 under investigation  Acute kidney injury[de-identified] Resolved  Recent history of ischemic stroke with residual left hemiplegia 
  
Continue vancomycin and Zosyn, follow-up on culture and sensitivity. Wean off norepinephrine as tolerated   Failed SBT, tachypneic with low tidal volume and thick secretion. Resume mechanical ventilation and wean as tolerated  Repeat COVID-19 testing, patient is high risk given his recent hospitalization and nursing home stay as well as his respiratory symptoms with fever and x-ray finding of bilateral congestion.  Kidney function back to baseline. Currently hypernatremic, increase free water via NG tube. Continue to monitor  Start tube feeding DVT and GI prophylaxis. Ventilator bundle. DISPOSITION Stay in ICU CRITICAL CARE CONSULTANT NOTE I had a face to face encounter with the patient, reviewed and interpreted patient data including clinical events, labs, images, vital signs, I/O's, and examined patient. I have discussed the case and the plan and management of the patient's care with the consulting services, the bedside nurses and the respiratory therapist.   
 

## 2020-09-02 NOTE — PROGRESS NOTES
Problem: Ventilator Management Goal: *Adequate oxygenation and ventilation Outcome: Progressing Towards Goal 
Goal: *Patient maintains clear airway/free of aspiration Outcome: Progressing Towards Goal 
Goal: *Absence of infection signs and symptoms Outcome: Progressing Towards Goal 
Goal: *Normal spontaneous ventilation Outcome: Progressing Towards Goal 
  
Problem: Patient Education: Go to Patient Education Activity Goal: Patient/Family Education Outcome: Progressing Towards Goal 
  
Problem: Non-Violent Restraints Goal: *Removal from restraints as soon as assessed to be safe Outcome: Progressing Towards Goal 
Goal: *No harm/injury to patient while restraints in use Outcome: Progressing Towards Goal 
Goal: *Patient's dignity will be maintained Outcome: Progressing Towards Goal 
Goal: *Patient Specific Goal (EDIT GOAL, INSERT TEXT) Outcome: Progressing Towards Goal 
Goal: Non-violent Restaints:Standard Interventions Outcome: Progressing Towards Goal 
Goal: Non-violent Restraints:Patient Interventions Outcome: Progressing Towards Goal 
Goal: Patient/Family Education Outcome: Progressing Towards Goal 
  
Problem: Falls - Risk of 
Goal: *Absence of Falls Description: Document Naman Jonathan Fall Risk and appropriate interventions in the flowsheet. Outcome: Progressing Towards Goal 
Note: Fall Risk Interventions: 
Mobility Interventions: Communicate number of staff needed for ambulation/transfer, Strengthening exercises (ROM-active/passive) Mentation Interventions: Adequate sleep, hydration, pain control, Door open when patient unattended, Evaluate medications/consider consulting pharmacy, Increase mobility, More frequent rounding, Reorient patient, Update white board Medication Interventions: Evaluate medications/consider consulting pharmacy Elimination Interventions: Toileting schedule/hourly rounds History of Falls Interventions: Door open when patient unattended, Evaluate medications/consider consulting pharmacy, Investigate reason for fall, Room close to nurse's station Problem: Patient Education: Go to Patient Education Activity Goal: Patient/Family Education Outcome: Progressing Towards Goal 
  
Problem: Pressure Injury - Risk of 
Goal: *Prevention of pressure injury Description: Document Jay Scale and appropriate interventions in the flowsheet. Outcome: Progressing Towards Goal 
Note: Pressure Injury Interventions: 
Sensory Interventions: Assess changes in LOC, Assess need for specialty bed, Float heels, Keep linens dry and wrinkle-free, Maintain/enhance activity level, Minimize linen layers, Turn and reposition approx. every two hours (pillows and wedges if needed) Moisture Interventions: Absorbent underpads, Check for incontinence Q2 hours and as needed, Internal/External urinary devices, Maintain skin hydration (lotion/cream), Minimize layers, Apply protective barrier, creams and emollients Activity Interventions: Assess need for specialty bed, Pressure redistribution bed/mattress(bed type) Mobility Interventions: Assess need for specialty bed, Float heels, HOB 30 degrees or less, Pressure redistribution bed/mattress (bed type), Turn and reposition approx. every two hours(pillow and wedges) Nutrition Interventions: Document food/fluid/supplement intake Friction and Shear Interventions: Apply protective barrier, creams and emollients, HOB 30 degrees or less, Lift sheet, Minimize layers, Foam dressings/transparent film/skin sealants Problem: Patient Education: Go to Patient Education Activity Goal: Patient/Family Education Outcome: Progressing Towards Goal

## 2020-09-02 NOTE — PROGRESS NOTES
1930: Bedside and Verbal shift change report given to [address] (oncoming nurse) by Robert RN (offgoing nurse). Report included the following information SBAR, Kardex, Intake/Output, MAR, Recent Results, Cardiac Rhythm NSR and Alarm Parameters . 18: Reported pt's low potassium level (2.9) to Octavio  NP. Orders received. 4035: Reported pt's critical phosphorus level (0.7) to Octavio  NP. Orders received. 0730: Bedside and Verbal shift change report given to Amena Kolb (oncoming nurse) by Arya Fuentes RN (offgoing nurse). Report included the following information SBAR, Kardex, ED Summary, Procedure Summary, Intake/Output, MAR, Recent Results, Cardiac Rhythm NSR and Alarm Parameters .

## 2020-09-02 NOTE — PROGRESS NOTES
Pharmacist Note - Vancomycin Dosing Therapy day 3 Indication: sepsis Current regimen: 1 gm IV Q 8hr A Trough Level resulted at 14.3 mcg/mL which was obtained 10.5 hrs post-dose. The extrapolated \"true\" trough is approximately 18.5 mcg/mL based on the patient's known kinetics. Goal trough: 15 - 20 mcg/mL Plan: Continue current regimen. Pharmacy will continue to monitor this patient daily for changes in clinical status and renal function.

## 2020-09-02 NOTE — PROGRESS NOTES
[facility] 
 
ICU TEAM Progress Note Name: Crsitofer Ulloa : 1960 MRN: [id number] Date: 2020 I Subjective:  
Progress Note: 2020 Reason for ICU Admission: Respiratory failure Interval history: 
69-year-old gentleman nursing home resident who recently was at [address] [facility] after sustaining a large stroke left him with left hemiplegia.  After few weeks of that incident he was transferred out to a nursing home where he was found poorly responsive after receiving pain medication.  Transferred to our ER where we had to be intubated and was found to be hypotensive and septic shock pressors initiated as well as broad-spectrum antibiotic. Hemodynamically improved and norepinephrine was discontinued afternoon on . His renal function also improved and back to baseline. Overnight Events:  
Failed SBT yesterday due to thick secretion and tachypnea. No further fever. Tolerating tube feeding with good urine output. Continue to require sedation. Active Problem List:  
 
Problem List  Never Reviewed Codes Class ARF (acute renal failure) ([facility]) ICD-10-CM: N17.9 ICD-9-CM: 584.9 Leukocytosis ICD-10-CM: G13.230 ICD-9-CM: 288.60 Acute respiratory failure with hypoxemia ([facility]) ICD-10-CM: J96.01 
ICD-9-CM: 518.81   
   
 * (Principal) Septic shock ([facility]) ICD-10-CM: A41.9, R65.21 ICD-9-CM: 038.9, 785.52, 995.92 Past Medical History:  
 
 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 Past Surgical History:  
 
 has a past surgical history that includes hx orthopaedic and hx appendectomy. Home Medications:  
 
Prior to Admission medications Medication Sig Start Date End Date Taking? Authorizing Provider  
acetaminophen (TYLENOL) 325 mg tablet Take 650 mg by mouth every four (4) hours as needed for Pain.    Yes Provider, Historical  
 albuterol (PROVENTIL VENTOLIN) 2.5 mg /3 mL (0.083 %) nebu 2.5 mg by Nebulization route every six (6) hours as needed for Wheezing. Yes Provider, Historical  
amLODIPine (NORVASC) 10 mg tablet Take 10 mg by mouth daily. Yes Provider, Historical  
atorvastatin (LIPITOR) 40 mg tablet Take 40 mg by mouth nightly. Yes Provider, Historical  
calcium carbonate (OS-JOE) 500 mg calcium (1,250 mg) tablet Take 1 Tab by mouth three (3) times daily. Yes Provider, Historical  
diclofenac (VOLTAREN) 1 % gel Apply 2 g to affected area two (2) times a day. Yes Provider, Historical  
folic acid (FOLVITE) 1 mg tablet Take 1 mg by mouth daily. Yes Provider, Historical  
furosemide (LASIX) 20 mg tablet Take 20 mg by mouth daily. For edema   Yes Provider, Historical  
guaiFENesin-codeine (guaiFENesin AC) 100-10 mg/5 mL solution Take 10 mL by mouth every four (4) hours as needed for Cough. Yes Provider, Historical  
guar gum (BENEFIBER) packet Take 1 Packet by mouth every eight (8) hours as needed for Diarrhea. Yes Provider, Historical  
heparin sodium,porcine (heparin, porcine,) 5,000 unit/mL injection 5,000 Units by SubCUTAneous route every eight (8) hours. Yes Provider, Historical  
albuterol-ipratropium (DUO-NEB) 2.5 mg-0.5 mg/3 ml nebu 3 mL by Nebulization route every six (6) hours as needed for Wheezing or Other (O2 desats). Yes Provider, Historical  
lisinopriL (PRINIVIL, ZESTRIL) 10 mg tablet Take 10 mg by mouth daily. Yes Provider, Historical  
methocarbamoL (ROBAXIN) 500 mg tablet Take 1,000 mg by mouth every six (6) hours. Yes Provider, Historical  
therapeutic multivitamin (THERAGRAN) tablet Take 1 Tab by mouth daily. Yes Provider, Historical  
ondansetron hcl (ZOFRAN) 4 mg tablet Take 4 mg by mouth every six (6) hours as needed for Nausea or Nausea or Vomiting. Yes Provider, Historical  
thiamine HCL (B-1) 100 mg tablet Take 100 mg by mouth daily.    Yes Provider, Historical  
 traMADoL (ULTRAM) 50 mg tablet Take 50 mg by mouth every six (6) hours as needed for Pain. Yes Provider, Historical  
traMADoL (ULTRAM) 50 mg tablet Take 50 mg by mouth every six (6) hours. Yes Provider, Historical  
traZODone (DESYREL) 50 mg tablet Take 50 mg by mouth nightly. Yes Provider, Historical  
 
 
Allergies/Social/Family History: Allergies Allergen Reactions  Hydrocodone Rash and Nausea and Vomiting Social History Tobacco Use  Smoking status: Current Every Day Smoker Packs/day: 1.00 Substance Use Topics  Alcohol use: Yes Comment: 12 pack every 2 days History reviewed. No pertinent family history. Review of Systems:  
 
Not able to obtain as he is sedated intubated Objective:  
Vital Signs: 
Visit Vitals /68 (BP 1 Location: Right arm, BP Patient Position: At rest) Pulse 78 Temp 98.8 °F (37.1 °C) Resp 20 Ht 5' 7\" (1.702 m) Wt 73.5 kg (162 lb) SpO2 94% BMI 25.37 kg/m² O2 Flow Rate (L/min): 15 l/min O2 Device: Endotracheal tube Temp (24hrs), Av.4 °F (37.4 °C), Min:98.8 °F (37.1 °C), Max:100.3 °F (37.9 °C) Intake/Output:  
 
Intake/Output Summary (Last 24 hours) at 2020 1651 Last data filed at 2020 1300 Gross per 24 hour Intake 5130.51 ml Output 1428 ml Net 3702.51 ml Physical Exam: 
 
General:   Sedated intubated, well developed, appears stated age Eyes:  Sclera anicteric. Pupils equally round and reactive to light. Mouth/Throat: Mucous membranes normal, ET tube Neck: Supple, central line removed Lungs:   Clear to auscultation bilaterally, good effort CV:  Regular rate and rhythm,no murmur, click, rub or gallop Abdomen:   Soft, non-tender. bowel sounds normal. non-distended Extremities: No cyanosis or edema Skin:  Sacral deep tissue injury, no open skin. Followed by wound care Lymph nodes: Cervical and supraclavicular normal  
Musculoskeletal: No swelling or deformity Lines/Devices:  Intact, no erythema, drainage or tenderness Psych:  Sedated intubated mechanical ventilation, open eyes to sternal rub, some movement on the right side to painful stimuli despite sedation LABS AND  DATA: Personally reviewed Recent Labs  
  09/02/20 0319 09/01/20 
9480 WBC 21.8* 23.3* HGB 10.8* 12.2 HCT 33.6* 37.5  247 Recent Labs  
  09/02/20 
0319 09/01/20 
1110 09/01/20 
5173 * 154* 152*  
K 2.9* 3.7 3.0*  
* 122* 121* CO2 28 28 26 BUN 13 19 25* CREA 0.68* 0.69* 0.81 * 83 87  
CA 7.9* 7.9* 7.9*  
MG 2.2  --  2.2 PHOS 0.7*  --  1.6* Recent Labs  
  09/02/20 0319 09/01/20 
9711 AP 70 57  
TP 5.2* 5.7* ALB 1.6* 2.1*  
GLOB 3.6 3.6 Recent Labs  
  09/02/20 0319 09/01/20 
5614 INR 1.0 1.1 PTP 10.4 11.2* Recent Labs  
  09/02/20 
0341 09/01/20 
9425 PHI 7.41 7.38  
PCO2I 44.7 46.2*  
PO2I 94 208* FIO2I 30 50 Recent Labs 08/30/20 2027 TROIQ <0.05 Hemodynamics:  
PAP:   CO:    
Wedge:   CI:    
CVP:    SVR:    
  PVR:    
 
Ventilator Settings: 
Mode Rate Tidal Volume Pressure FiO2 PEEP Assist control   480 ml    30 % 5 cm H20 Peak airway pressure: 27 cm H2O Minute ventilation: 9.99 l/min MEDS: Reviewed Chest X-Ray: CXR Results  (Last 48 hours) 09/01/20 1910  XR CHEST PORT Final result Impression:  IMPRESSION: The tip of the right subclavian central venous catheter is possibly  
kinked. Narrative:  EXAM: XR CHEST PORT INDICATION: malfunctioning central line COMPARISON: 9/1/2020 FINDINGS: A portable AP radiograph of the chest was obtained at 1901 hours. The  
patient is on a cardiac monitor. Right subclavian central venous catheter is  
noted. The tip is in the region of the proximal SVC. The tip may be kinked. Endotracheal tube is in stable position.  Nasogastric tube courses into the  
 abdomen. Mild pulmonary edema suspected. Chronic right-sided rib fractures are  
noted. Gilbert Sidney Heart size is normal.. .   
   
  
 09/01/20 1154  XR CHEST PORT Final result Impression:  IMPRESSION:  
Interval increased hazy parenchymal opacity in the right lung most likely  
related to mild interstitial pulmonary edema. Narrative:  Clinical history: hypoxia INDICATION:   hypoxia COMPARISON: 8/31/2020 FINDINGS:  
AP portable upright view of the chest demonstrates a stable  cardiopericardial  
silhouette. Interval hazy parenchymal opacity in the right lung. ET tube, NG tube  
and central venous access catheter on the right unchanged. Patient is on a  
cardiac monitor. Assessment and Plan: Septic shock[de-identified] Resolved  Acute hypoxic hypercapnic respiratory failure:  COVID-19 ruled out  Acute kidney injury[de-identified] Resolved  Recent history of ischemic stroke with residual left hemiplegia 
  
Continue vancomycin and Zosyn, follow-up on culture and sensitivity [remain negative but for occasional yeast on sputum].  Off pressors for more than 24-hour   Failed SBT, tachypneic with low tidal volume and thick secretion. Resume mechanical ventilation and wean as tolerated Jeffrie Fermo function back to baseline. Currently hypernatremic, increase free water via NG tube. Discontinue NS. Continue to monitor  Tolerating tube feeding DVT and GI prophylaxis.  Ventilator bundle. DISPOSITION Stay in ICU CRITICAL CARE CONSULTANT NOTE I had a face to face encounter with the patient, reviewed and interpreted patient data including clinical events, labs, images, vital signs, I/O's, and examined patient. I have discussed the case and the plan and management of the patient's care with the consulting services, the bedside nurses and the respiratory therapist.   
 
NOTE OF PERSONAL INVOLVEMENT IN CARE This patient has a high probability of imminent, clinically significant deterioration, which requires the highest level of preparedness to intervene urgently. I participated in the decision-making and personally managed or directed the management of the following life and organ supporting interventions that required my frequent assessment to treat or prevent imminent deterioration. I personally spent 40 minutes of critical care time. This is time spent at this critically ill patient's bedside actively involved in patient care as well as the coordination of care and discussions with the patient's family. This does not include any procedural time which has been billed separately. Deric Salazar M.D. Staff Intensivist/Pulmonologist 
[facility] Care 9/2/2020

## 2020-09-02 NOTE — PROGRESS NOTES
Bedside and Verbal shift change report given to [address], RN (oncoming nurse) by Myriam Vasquez RN (offgoing nurse). Report included the following information SBAR, Kardex, Intake/Output, MAR, Recent Results, Cardiac Rhythm NSR, Alarm Parameters  and Dual Neuro Assessment.

## 2020-09-02 NOTE — PROCEDURES
PICC Placement Note PRE-PROCEDURE VERIFICATION Correct Procedure: yes Correct Site:  yes Temperature: Temp: 98.8 °F (37.1 °C), Temperature Source: Temp Source: Bladder Recent Labs  
  09/02/20 
0319 BUN 13  
CREA 0.68*  INR 1.0 WBC 21.8* Allergies: Hydrocodone Education materials, including PICC Booklet and written information regarding central catheter related bloodstream infection and prevention given to patient. See Patient Education activity for further details. PROCEDURE DETAIL A triple lumen power injectable PICC line was started for vascular access. The following documentation is in addition to the PICC properties in the lines/airways flowsheet : 
Lot #: [id number] Xylocaine 1% used intradermally:  yes Total Catheter Length: 37 (cm) External Catheter Length: 1 (cm) Circumference: 28 (cm) Vein Selection for PICC: right basilic Central Line Bundle followed: yes Complication Related to Insertion: none The placement was verified by ECG technology. The PICC is on the right side and the tip overlies the superior vena cava. ECG verification documentation is on the patient's paper chart. Line is okay to use. Report to Sharee Clayton. Robe Varela, BECKYN, RN, [facility]  Vascular Access Team

## 2020-09-03 NOTE — WOUND CARE
WOCN Note: Follow-up visit for foot and sacrum. Chart shows: 
Admitted for cardiovascular septic shock, intubated, sedated, on pressors History of CVA, seizures, Segovia sarcoma, HepC Admitted from [address] prior to that 
  
Assessment:  
Intubated & sedated. Bilateral wrist restraints. RN at bedside to assist in full turn from right to left. Cleaned him of large liquid BM. [address] Bed: [facility] with air mattress 
  
Bilateral heel skin intact and without erythema. Heels offloaded with pillows. 
  
1. POA, sacrum, upper gluteal cleft, and predominantly toward left buttock = 8 x 8 x 0.1 cm  95% non-blanching indurated purple 5% open non-blanching purple in center where skin slid away. Venelex and foam dressing applied.  
  
2. POA, right lateral foot deep tissue injury = 1 x 0.8 x 0 cm  100% blanching red - improved and appears to be resolving. 3. 2 burgundy areas noted to left lateral foot and left lateral ankle. Each = 0.5 x 0.5 x 0 cm no blistering or fluctuance. venelex applied   
  
Recommendations:   
Sacrum & right lateral foot: venelex twice daily and cover with sacral foam dressing Turn/reposition approximately every 2 hours and offload heels with pillows at all times in bed. NO BOOTS Envision air mattress ordered via [employer] [id number]. Use only flat sheet and one incontinence pad. Please call [employer] @ [phone] for bed to be picked up at discharge.  
  
Discussed with RN. 
  
Transition of Care: Plan to follow weekly and as needed while admitted to hospital.  
 
Kathy Friend, BECKYN, RN, [facility] Certified Wound, Ostomy, Continence Nurse 
office [phone] 
pager [phone] or call  to page

## 2020-09-03 NOTE — PROGRESS NOTES
09/03/20 0030 09/03/20 8198 Oxygen Therapy O2 Sat (%) 99 % 99 % Pulse via Oximetry 144 beats per minute 143 beats per minute O2 Device Ventilator;Endotracheal tube Ventilator;Endotracheal tube O2 Temperature  
(HME)  
(HME) FIO2 (%) 30 % 30 % Pre-Treatment Breathing Pattern Labored  --   
Cough Strong;Congested;Productive  -- Sputum amount Moderate  -- Sputum color/odor Other (comment) 
(cream)  -- Sputum consistency Thick; Tenacious  -- Sputum method obtained Endotracheal  --   
Breath Sounds Bilateral Coarse; Expiratory wheezing;Diminished 
(tight)  -- Respirations 22  --   
Post-Treatment Breathing Pattern  --  Regular Breath Sounds Bilateral  --  Coarse (increased aeration T/O) Pulse  --  143 SpO2  --  99 % Respirations  --  16 Treatment Tolerance  --  Passively Procedures Delivery Source In-line nebulizer (via [address] [place] [address])  -- Aerosolized Medications DuoNeb  --   
 
RT responded to Vent alarm. Pt noted to be in distress. HR 140s, labored respirations. L/S tight with distant wheezes heard B/L. Provider notified. OK to give PRN Duoneb tx. Increased aeration b/l after tx. HR remains high. Will monitor.

## 2020-09-03 NOTE — PROGRESS NOTES
[facility] 
 
ICU TEAM Progress Note Name: Cailin Ruff : 1960 MRN: [id number] Date: 9/3/2020 I Subjective:  
Progress Note: 9/3/2020 Reason for ICU Admission: Respiratory failure Interval history: 
38-year-old gentleman nursing home resident who recently was at [address] [facility] after sustaining a large stroke left him with left hemiplegia.  After few weeks of that incident he was transferred out to a nursing home where he was found poorly responsive after receiving pain medication.  Transferred to our ER where we had to be intubated and was found to be hypotensive and septic shock pressors initiated as well as broad-spectrum antibiotic. Hemodynamically improved and norepinephrine was discontinued afternoon on . His renal function also improved and back to baseline. Overnight Events: This morning failed SBT. I placed him on pressure support 8/5 remained for about 1 and half hour off sedation. He was not opening eyes spontaneously however showing thumbs up when asked to. He had very weak cough and became tachypneic with respiratory rate in the mid 30s to the low 40s with tidal volume around 300 that was progressively decreasing with time. He also showed signs of accessory muscle use that was progressive with time as well. No acute event overnight, no further fever. Still thick secretion Active Problem List:  
 
Problem List  Never Reviewed Codes Class ARF (acute renal failure) ([facility]) ICD-10-CM: N17.9 ICD-9-CM: 584.9 Leukocytosis ICD-10-CM: L56.212 ICD-9-CM: 288.60 Acute respiratory failure with hypoxemia (HCC) ICD-10-CM: J96.01 
ICD-9-CM: 518.81   
   
 * (Principal) Septic shock ([facility]) ICD-10-CM: A41.9, R65.21 ICD-9-CM: 038.9, 785.52, 995.92 Past Medical History:  
 
 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 Past Surgical History: has a past surgical history that includes hx orthopaedic and hx appendectomy. Home Medications:  
 
Prior to Admission medications Medication Sig Start Date End Date Taking? Authorizing Provider  
acetaminophen (TYLENOL) 325 mg tablet Take 650 mg by mouth every four (4) hours as needed for Pain. Yes Provider, Historical  
albuterol (PROVENTIL VENTOLIN) 2.5 mg /3 mL (0.083 %) nebu 2.5 mg by Nebulization route every six (6) hours as needed for Wheezing. Yes Provider, Historical  
amLODIPine (NORVASC) 10 mg tablet Take 10 mg by mouth daily. Yes Provider, Historical  
atorvastatin (LIPITOR) 40 mg tablet Take 40 mg by mouth nightly. Yes Provider, Historical  
calcium carbonate (OS-JOE) 500 mg calcium (1,250 mg) tablet Take 1 Tab by mouth three (3) times daily. Yes Provider, Historical  
diclofenac (VOLTAREN) 1 % gel Apply 2 g to affected area two (2) times a day. Yes Provider, Historical  
folic acid (FOLVITE) 1 mg tablet Take 1 mg by mouth daily. Yes Provider, Historical  
furosemide (LASIX) 20 mg tablet Take 20 mg by mouth daily. For edema   Yes Provider, Historical  
guaiFENesin-codeine (guaiFENesin AC) 100-10 mg/5 mL solution Take 10 mL by mouth every four (4) hours as needed for Cough. Yes Provider, Historical  
guar gum (BENEFIBER) packet Take 1 Packet by mouth every eight (8) hours as needed for Diarrhea. Yes Provider, Historical  
heparin sodium,porcine (heparin, porcine,) 5,000 unit/mL injection 5,000 Units by SubCUTAneous route every eight (8) hours. Yes Provider, Historical  
albuterol-ipratropium (DUO-NEB) 2.5 mg-0.5 mg/3 ml nebu 3 mL by Nebulization route every six (6) hours as needed for Wheezing or Other (O2 desats). Yes Provider, Historical  
lisinopriL (PRINIVIL, ZESTRIL) 10 mg tablet Take 10 mg by mouth daily. Yes Provider, Historical  
methocarbamoL (ROBAXIN) 500 mg tablet Take 1,000 mg by mouth every six (6) hours.    Yes Provider, Historical  
 therapeutic multivitamin (THERAGRAN) tablet Take 1 Tab by mouth daily. Yes Provider, Historical  
ondansetron hcl (ZOFRAN) 4 mg tablet Take 4 mg by mouth every six (6) hours as needed for Nausea or Nausea or Vomiting. Yes Provider, Historical  
thiamine HCL (B-1) 100 mg tablet Take 100 mg by mouth daily. Yes Provider, Historical  
traMADoL (ULTRAM) 50 mg tablet Take 50 mg by mouth every six (6) hours as needed for Pain. Yes Provider, Historical  
traMADoL (ULTRAM) 50 mg tablet Take 50 mg by mouth every six (6) hours. Yes Provider, Historical  
traZODone (DESYREL) 50 mg tablet Take 50 mg by mouth nightly. Yes Provider, Historical  
 
 
Allergies/Social/Family History: Allergies Allergen Reactions  Hydrocodone Rash and Nausea and Vomiting Social History Tobacco Use  Smoking status: Current Every Day Smoker Packs/day: 1.00 Substance Use Topics  Alcohol use: Yes Comment: 12 pack every 2 days History reviewed. No pertinent family history. Review of Systems:  
 
Not able to obtain due to his medical condition Objective:  
Vital Signs: 
Visit Vitals /65 Pulse (!) 101 Temp 99.1 °F (37.3 °C) Resp 16 Ht 5' 7\" (1.702 m) Wt 73.5 kg (162 lb) SpO2 96% BMI 25.37 kg/m² O2 Flow Rate (L/min): 15 l/min O2 Device: Ventilator, Endotracheal tube, Heated, Humidifier Temp (24hrs), Av.5 °F (37.5 °C), Min:99.1 °F (37.3 °C), Max:100.1 °F (37.8 °C) Intake/Output:  
 
Intake/Output Summary (Last 24 hours) at 9/3/2020 1728 Last data filed at 9/3/2020 1600 Gross per 24 hour Intake 3505.63 ml Output 1780 ml Net 1725.63 ml Physical Exam: 
 
General:   Sedated intubated, well developed, appears stated age Eyes:  Sclera anicteric. Pupils equally round and reactive to light. Mouth/Throat: Mucous membranes normal, ET tube Neck: Supple, central line removed Lungs:   Clear to auscultation bilaterally, good effort CV:  Regular rate and rhythm,no murmur, click, rub or gallop Abdomen:   Soft, non-tender. bowel sounds normal. non-distended Extremities: No cyanosis or edema Skin:  Sacral deep tissue injury, no open skin.  Followed by wound care Lymph nodes: Cervical and supraclavicular normal  
Musculoskeletal: No swelling or deformity Lines/Devices:  Intact, no erythema, drainage or tenderness Psych:  Sedated intubated mechanical ventilation, open eyes to sternal rub, some movement on the right side to painful stimuli despite sedation LABS AND  DATA: Personally reviewed Recent Labs  
  09/03/20 0429 09/02/20 0319 WBC 15.9* 21.8* HGB 10.7* 10.8* HCT 33.5* 33.6*  247 Recent Labs  
  09/03/20 
6018 09/03/20 0429 09/02/20 
1635 09/02/20 0319 *  --  152* 153* K 3.5  --  3.4* 2.9*  
*  --  119* 122* CO2 29  --  30 28 BUN 10  --  11 13 CREA 0.65*  --  0.71 0.68* *  --  117* 106* CA 8.0*  --  8.0* 7.9*  
MG  --  2.1  --  2.2 PHOS  --  1.8* 1.2* 0.7* Recent Labs  
  09/03/20 
0807 09/02/20 0319 09/01/20 
1710 AP  --  70 57 TP  --  5.2* 5.7* ALB 1.5* 1.6* 2.1*  
GLOB  --  3.6 3.6 Recent Labs  
  09/03/20 0429 09/02/20 
0319 INR 1.0 1.0 PTP 10.0 10.4 Recent Labs  
  09/03/20 
0450 09/02/20 
0341 PHI 7.41 7.41  
PCO2I 44.6 44.7 PO2I 72* 94  
FIO2I 24 30 No results for input(s): CPK, CKMB, TROIQ, BNPP in the last 72 hours. Hemodynamics:  
PAP:   CO:    
Wedge:   CI:    
CVP:    SVR:    
  PVR:    
 
Ventilator Settings: 
Mode Rate Tidal Volume Pressure FiO2 PEEP Assist control, VC+   480 ml  8 cm H2O 24 % 5 cm H20 Peak airway pressure: 18 cm H2O Minute ventilation: 7.53 l/min MEDS: Reviewed Chest X-Ray: CXR Results  (Last 48 hours) 09/03/20 0602  XR CHEST PORT Final result Impression:  IMPRESSION:   
1. ET tube is just above the devonte. 2. There is increasing airspace disease left mid and lower thorax. There is  
slight increasing right basilar atelectasis/airspace disease which could  
represent edema and/or pneumonia Narrative:  EXAM:  XR CHEST PORT INDICATION:  resp. failure COMPARISON:  9/1/2020 FINDINGS: A portable AP radiograph of the chest was obtained at 341 hours. ET  
tube is just above the devonte and unchanged. NG tube traverses the mediastinum  
into the upper abdomen. Right PICC catheter tip overlies SVC right atrial  
junction. There is increasing airspace disease in the left mid and lower thorax  
there is slight increasing right basilar atelectasis/airspace disease. Jonne Leader Heart  
size is stable. . .   
   
  
 09/01/20 1910  XR CHEST PORT Final result Impression:  IMPRESSION: The tip of the right subclavian central venous catheter is possibly  
kinked. Narrative:  EXAM: XR CHEST PORT INDICATION: malfunctioning central line COMPARISON: 9/1/2020 FINDINGS: A portable AP radiograph of the chest was obtained at 1901 hours. The  
patient is on a cardiac monitor. Right subclavian central venous catheter is  
noted. The tip is in the region of the proximal SVC. The tip may be kinked. Endotracheal tube is in stable position. Nasogastric tube courses into the  
abdomen. Mild pulmonary edema suspected. Chronic right-sided rib fractures are  
noted. Jonne Leader Heart size is normal.. . Assessment and Plan: Septic shock[de-identified] Resolved  Acute hypoxic hypercapnic respiratory failure:  COVID-19 ruled out  Acute kidney injury:: Resolved  Recent history of ischemic stroke with residual left hemiplegia 
  
Continue Zosyn, discontinue vancomycin. Follow-up on culture and sensitivity [remain negative but for occasional yeast on sputum].  Off pressors for more than 24-hour   Failed SBT, tachypneic with low tidal volume and thick secretion.  Resume mechanical ventilation and wean as tolerated  Kidney function back to baseline.  Currently hypernatremic, improving with free water via NG tube. Continue to monitor  Tolerating tube feeding  
  
DVT and GI prophylaxis.  Ventilator bundle. DISPOSITION Stay in ICU CRITICAL CARE CONSULTANT NOTE I had a face to face encounter with the patient, reviewed and interpreted patient data including clinical events, labs, images, vital signs, I/O's, and examined patient. I have discussed the case and the plan and management of the patient's care with the consulting services, the bedside nurses and the respiratory therapist.   
 
NOTE OF PERSONAL INVOLVEMENT IN CARE This patient has a high probability of imminent, clinically significant deterioration, which requires the highest level of preparedness to intervene urgently. I participated in the decision-making and personally managed or directed the management of the following life and organ supporting interventions that required my frequent assessment to treat or prevent imminent deterioration. I personally spent 40 minutes of critical care time. This is time spent at this critically ill patient's bedside actively involved in patient care as well as the coordination of care and discussions with the patient's family. This does not include any procedural time which has been billed separately. Pearl Peterson M.D. Staff Intensivist/Pulmonologist 
[facility] Care 9/3/2020

## 2020-09-03 NOTE — PROGRESS NOTES
09/03/20 1310 Weaning Parameters Spontaneous Breathing Trial Complete Yes 
(patient placed on SBT by Dr. Yaw Mckeon) Resp Rate Observed 30 Ve 9.3  RSBI 105 On Pressure support of 8 PEEP 5 Placed patient back on previous vent settings at this time.

## 2020-09-03 NOTE — PROGRESS NOTES
Problem: Breathing Pattern - Ineffective Goal: *Absence of hypoxia Outcome: Progressing Towards Goal 
Goal: *Use of effective breathing techniques Outcome: Progressing Towards Goal 
  
Problem: Patient Education: Go to Patient Education Activity Goal: Patient/Family Education Outcome: Progressing Towards Goal 
  
Problem: Ventilator Management Goal: *Adequate oxygenation and ventilation Outcome: Progressing Towards Goal 
Goal: *Patient maintains clear airway/free of aspiration Outcome: Progressing Towards Goal 
Goal: *Absence of infection signs and symptoms Outcome: Progressing Towards Goal 
Goal: *Normal spontaneous ventilation Outcome: Progressing Towards Goal 
  
Problem: Patient Education: Go to Patient Education Activity Goal: Patient/Family Education Outcome: Progressing Towards Goal

## 2020-09-04 NOTE — PROGRESS NOTES
BART 
Patient presented to the ED form [facility] for AMS, shortness of breath and hypotension. Patient admitted with sepsis RUR: 12% COVID pending Disposition: Return to [facility]  for completion of rehab Will need therapy evaluation for referral.  
  
Patient remains vented sedated on propofol, failed SBT. Patient is following some commands. Care management is continuing to follow for transitions of care. Teresa Lee RN,CRM

## 2020-09-04 NOTE — PROGRESS NOTES
0730: Bedside shift change report given to Lexus Barclay RN (oncoming nurse) by Danny Rae RN (offgoing nurse). Report included the following information SBAR, Kardex, Intake/Output, MAR, Accordion, Recent Results and Cardiac Rhythm NSR-ST.  
 
SBT from 4697-9400. O2 remained >92%. -190s. HR stable. RT placed pt back on pre SBT vent settings per Dr. Destinee Parker order. Per MD, expectations for extubation and risk of reintubation discussed with pt's daughter and situation will be revisited at a later time. Propofol restarted per MD order. 1000: Dr. Maribeth Timmons notified of fevers overnight and current SBP 170s. Order received for PRN labetalol for SBP >180.  
 
1200: Order received from Dr. Maribeth Timmons to remove arterial line. 1540: 12 minute episode of increased HR to 140s. Unable to obtain EKG in time. Dr. Maribeth Timmons notified. 1755: Another 7 minute tachycardic (140s) episode. EKG obtained. Dr. Maribeth Timmons notified and shown EKG. Identified as sinus rhythm. MD also notified of fever 101.3, Tylenol given. Order received for paired blood cultures. 1930: Bedside shift change report given to [facility], RN (oncoming nurse) by Lexus Barclay RN (offgoing nurse). Report included the following information SBAR, Kardex, Intake/Output, MAR, Accordion, Recent Results and Cardiac Rhythm NSR.

## 2020-09-04 NOTE — PROGRESS NOTES
Physician Progress Note Shorty Black 
CSN #:                  [id number] :                       1960 ADMIT DATE:       2020 8:06 PM 
[address] [place] DATE: 
RESPONDING 
PROVIDER #:        Sherren Endow MD [facility] MD 
 
 
 
 
QUERY TEXT: 
 
Pt admitted with sepsis Pt noted to have CASEY unspecified If possible, please document in the progress notes and discharge summary if you are evaluating and/or treating any of the following: The medical record reflects the following: 
Risk Factors: Sepsis Clinical Indicators: 
2020 20:27 BUN: 91 (H) Creatinine: 4.98 (H) GFR est non-AA: 12 (L) 
 
 
2020 02:18 
BUN: 78 (H) Creatinine: 3.19 (H) GFR est non-AA: 20 (L) 
 
 
2020 03:11 
BUN: 25 (H) Creatinine: 0.81 GFR est non-AA: >60 Treatment: 4 ly fluid bolus, lab monitoring, ICU monitoring Thank you, 
Cece Bonilla RN, [address] [facility]  
[phone] Options provided: 
-- Acute kidney failure with acute tubular necrosis -- Acute kidney failure 
-- Acute kidney injury -- Other - I will add my own diagnosis -- Disagree - Not applicable / Not valid -- Disagree - Clinically unable to determine / Unknown 
-- Refer to Clinical Documentation Reviewer PROVIDER RESPONSE TEXT: 
 
This patient has an Acute kidney injury.  
 
Query created by: Dama Hatchet on 2020 7:46 AM 
 
 
Electronically signed by:  Sherren Endow MD [place] Pinion MD 2020 8:12 AM

## 2020-09-04 NOTE — PROGRESS NOTES
[facility] 
 
ICU TEAM Progress Note Name: Betsy Dunn : 1960 MRN: [id number] Date: 2020 I Subjective:  
Progress Note: 2020 Reason for ICU Admission: Respiratory failure, septic shock Interval history: 25-year-old gentleman nursing home resident who recently was at [address] [facility] after sustaining a large stroke left him with left hemiplegia.  After few weeks of that incident he was transferred out to a nursing home where he was found poorly responsive after receiving pain medication.  Transferred to our ER where we had to be intubated and was found to be hypotensive and septic shock pressors initiated as well as broad-spectrum antibiotic.  Hemodynamically improved and norepinephrine was discontinued afternoon on .  His renal function also improved and back to baseline. Overnight Events:  
No acute event overnight. As detailed yesterday he failed spontaneous breathing trial.  WBC trending down, spiking fever this afternoon, good urine output, tolerating tube feeding. Active Problem List:  
 
Problem List  Never Reviewed Codes Class ARF (acute renal failure) ([facility]) ICD-10-CM: N17.9 ICD-9-CM: 584.9 Leukocytosis ICD-10-CM: I80.526 ICD-9-CM: 288.60 Acute respiratory failure with hypoxemia ([facility]) ICD-10-CM: J96.01 
ICD-9-CM: 518.81   
   
 * (Principal) Septic shock ([facility]) ICD-10-CM: A41.9, R65.21 ICD-9-CM: 038.9, 785.52, 995.92 Past Medical History:  
 
 has a past medical history of Epilepsia [facility]), [place] sarcoma ([facility] [address] Hepatitis C, and Seizures ([facility] [address] Past Surgical History:  
 
 has a past surgical history that includes hx orthopaedic and hx appendectomy. Home Medications:  
 
Prior to Admission medications Medication Sig Start Date End Date Taking?  Authorizing Provider  
acetaminophen (TYLENOL) 325 mg tablet Take 650 mg by mouth every four (4) hours as needed for Pain. Yes Provider, Historical  
albuterol (PROVENTIL VENTOLIN) 2.5 mg /3 mL (0.083 %) nebu 2.5 mg by Nebulization route every six (6) hours as needed for Wheezing. Yes Provider, Historical  
amLODIPine (NORVASC) 10 mg tablet Take 10 mg by mouth daily. Yes Provider, Historical  
atorvastatin (LIPITOR) 40 mg tablet Take 40 mg by mouth nightly. Yes Provider, Historical  
calcium carbonate (OS-JOE) 500 mg calcium (1,250 mg) tablet Take 1 Tab by mouth three (3) times daily. Yes Provider, Historical  
diclofenac (VOLTAREN) 1 % gel Apply 2 g to affected area two (2) times a day. Yes Provider, Historical  
folic acid (FOLVITE) 1 mg tablet Take 1 mg by mouth daily. Yes Provider, Historical  
furosemide (LASIX) 20 mg tablet Take 20 mg by mouth daily. For edema   Yes Provider, Historical  
guaiFENesin-codeine (guaiFENesin AC) 100-10 mg/5 mL solution Take 10 mL by mouth every four (4) hours as needed for Cough. Yes Provider, Historical  
guar gum (BENEFIBER) packet Take 1 Packet by mouth every eight (8) hours as needed for Diarrhea. Yes Provider, Historical  
heparin sodium,porcine (heparin, porcine,) 5,000 unit/mL injection 5,000 Units by SubCUTAneous route every eight (8) hours. Yes Provider, Historical  
albuterol-ipratropium (DUO-NEB) 2.5 mg-0.5 mg/3 ml nebu 3 mL by Nebulization route every six (6) hours as needed for Wheezing or Other (O2 desats). Yes Provider, Historical  
lisinopriL (PRINIVIL, ZESTRIL) 10 mg tablet Take 10 mg by mouth daily. Yes Provider, Historical  
methocarbamoL (ROBAXIN) 500 mg tablet Take 1,000 mg by mouth every six (6) hours. Yes Provider, Historical  
therapeutic multivitamin (THERAGRAN) tablet Take 1 Tab by mouth daily. Yes Provider, Historical  
ondansetron hcl (ZOFRAN) 4 mg tablet Take 4 mg by mouth every six (6) hours as needed for Nausea or Nausea or Vomiting.    Yes Provider, Historical  
 thiamine HCL (B-1) 100 mg tablet Take 100 mg by mouth daily. Yes Provider, Historical  
traMADoL (ULTRAM) 50 mg tablet Take 50 mg by mouth every six (6) hours as needed for Pain. Yes Provider, Historical  
traMADoL (ULTRAM) 50 mg tablet Take 50 mg by mouth every six (6) hours. Yes Provider, Historical  
traZODone (DESYREL) 50 mg tablet Take 50 mg by mouth nightly. Yes Provider, Historical  
 
 
Allergies/Social/Family History: Allergies Allergen Reactions  Hydrocodone Rash and Nausea and Vomiting Social History Tobacco Use  Smoking status: Current Every Day Smoker Packs/day: 1.00 Substance Use Topics  Alcohol use: Yes Comment: 12 pack every 2 days History reviewed. No pertinent family history. Review of Systems:  
 
Not able to obtain as he is sedated intubated Objective:  
Vital Signs: 
Visit Vitals /59 Pulse 78 Temp 99.6 °F (37.6 °C) Resp 17 Ht 5' 7\" (1.702 m) Wt 73.5 kg (162 lb) SpO2 98% BMI 25.37 kg/m² O2 Flow Rate (L/min): 15 l/min O2 Device: Endotracheal tube, Ventilator Temp (24hrs), Av.5 °F (37.5 °C), Min:99.1 °F (37.3 °C), Max:100.1 °F (37.8 °C) Intake/Output:  
 
Intake/Output Summary (Last 24 hours) at 2020 8566 Last data filed at 2020 0700 Gross per 24 hour Intake 1813.66 ml Output 1215 ml Net 598.66 ml Physical Exam: 
General:   Sedated intubated, well developed, appears stated age Eyes:  Sclera anicteric. Pupils equally round and reactive to light. Mouth/Throat: Mucous membranes normal, ET tube Neck: Supple, central line removed Lungs:   Clear to auscultation bilaterally, good effort CV:  Regular rate and rhythm,no murmur, click, rub or gallop Abdomen:   Soft, non-tender. bowel sounds normal. non-distended Extremities: No cyanosis or edema Skin:  Sacral deep tissue injury, no open skin.  Followed by wound care Lymph nodes: Cervical and supraclavicular normal  
 Musculoskeletal: No swelling or deformity Lines/Devices:  Intact, no erythema, drainage or tenderness Psych:  Sedated intubated mechanical ventilation, open eyes to sternal rub, some movement on the right side to painful stimuli despite sedation LABS AND  DATA: Personally reviewed Recent Labs  
  09/04/20 0559 09/03/20 
0429 WBC 10.2 15.9* HGB 9.8* 10.7* HCT 30.4* 33.5*  
 255 Recent Labs  
  09/04/20 0559 09/03/20 
3946 09/03/20 
0429 09/02/20 
1635 NA  --  149*  --  152* K  --  3.5  --  3.4*  
CL  --  117*  --  119* CO2  --  29  --  30  
BUN  --  10  --  11  
CREA  --  0.65*  --  0.71 GLU  --  116*  --  117* CA  --  8.0*  --  8.0*  
MG 1.9  --  2.1  --   
PHOS 2.9  --  1.8* 1.2* Recent Labs  
  09/04/20 
0559 09/03/20 
1173 09/02/20 
0319 AP  --   --  70  
TP  --   --  5.2* ALB 1.6* 1.5* 1.6*  
GLOB  --   --  3.6 Recent Labs  
  09/04/20 0559 09/03/20 0429 INR 1.0 1.0 PTP 10.3 10.0 Recent Labs  
  09/04/20 
0603 09/03/20 
0450 PHI 7.45 7.41  
PCO2I 39.2 44.6 PO2I 84 72* FIO2I 24 24 No results for input(s): CPK, CKMB, TROIQ, BNPP in the last 72 hours. Hemodynamics:  
PAP:   CO:    
Wedge:   CI:    
CVP:    SVR:    
  PVR:    
 
Ventilator Settings: 
Mode Rate Tidal Volume Pressure FiO2 PEEP Assist control, VC+   480 ml  8 cm H2O 24 % 5 cm H20 Peak airway pressure: 17 cm H2O Minute ventilation: 8.88 l/min MEDS: Reviewed Chest X-Ray: CXR Results  (Last 48 hours) 09/03/20 0602  XR CHEST PORT Final result Impression:  IMPRESSION:   
1. ET tube is just above the devonte. 2. There is increasing airspace disease left mid and lower thorax. There is  
slight increasing right basilar atelectasis/airspace disease which could  
represent edema and/or pneumonia Narrative:  EXAM:  XR CHEST PORT INDICATION:  resp. failure COMPARISON:  9/1/2020 FINDINGS: A portable AP radiograph of the chest was obtained at 341 hours. ET  
tube is just above the devonte and unchanged. NG tube traverses the mediastinum  
into the upper abdomen. Right PICC catheter tip overlies SVC right atrial  
junction. There is increasing airspace disease in the left mid and lower thorax  
there is slight increasing right basilar atelectasis/airspace disease. [facility]  
size is stable. . . Assessment and Plan: Septic shock:: Resolved  Acute hypoxic hypercapnic respiratory failure:  COVID-19 ruled out  Acute kidney injury:: Resolved  Recent history of ischemic stroke with residual left hemiplegia 
  
Continue Zosyn,  vancomycin was discontinued. Culture remain negative but he still spiking fever, repeat a pair of blood culture.  Off pressors for more than 24-hour  Again, failed SBT, tachypneic with low tidal volume and thick secretion.  Resume mechanical ventilation and wean as tolerated. He performed better than yesterday, I talked with his daughter discussed this with her, I discussed with her the goals of care and CODE STATUS, she is not sure if she is willing to allow us to reintubate him if he failed extubation giving his baseline severe neurological deficit after the stroke. She stated that she will call us again this evening or tomorrow morning and give us a final decision.  Kidney function back to baseline.  Currently hypernatremic, improving with free water via NG tube. Continue to monitor  Tolerating tube feeding  
  
DVT and GI prophylaxis.  Ventilator bundle. 
  
 
DISPOSITION Stay in ICU CRITICAL CARE CONSULTANT NOTE I had a face to face encounter with the patient, reviewed and interpreted patient data including clinical events, labs, images, vital signs, I/O's, and examined patient.   I have discussed the case and the plan and management of the patient's care with the consulting services, the bedside nurses and the respiratory therapist.   
 
NOTE OF PERSONAL INVOLVEMENT IN CARE This patient has a high probability of imminent, clinically significant deterioration, which requires the highest level of preparedness to intervene urgently. I participated in the decision-making and personally managed or directed the management of the following life and organ supporting interventions that required my frequent assessment to treat or prevent imminent deterioration. I personally spent 40 minutes of critical care time. This is time spent at this critically ill patient's bedside actively involved in patient care as well as the coordination of care and discussions with the patient's family. This does not include any procedural time which has been billed separately. Bev Garcia M.D. Staff Intensivist/Pulmonologist 
[facility] Care 9/4/2020

## 2020-09-04 NOTE — PROGRESS NOTES
0730: Bedside shift change report given to Laurel Ray RN (oncoming nurse) by Radha Preston RN (offgoing nurse). Report included the following information SBAR, Kardex, Intake/Output, MAR, Accordion, Recent Results and Cardiac Rhythm NSR.  
 
0800: Primary Nurse Pawan Grant and Adriel Adame RN performed a dual skin assessment on this patient Impairment noted- see wound doc flow sheet Jay score is 12 
 
1200: SAT started. Propofol and fentanyl gtt stopped per Dr. Gilford Haws. 1310: Tube feed held. SBT started. 1430: HR 120s, RR 32, O2 96%. Pt gives thumbs up to answer questions, coughs on request (weak), takes deep breaths on command, but is too drowsy to open eyes. Per Dr. Gilford Haws, end SBT, place pt back on pre-SBT venht settings, and resume propofol but not fentanyl gtt. 1930: Bedside shift change report given to Jr Cabrera RN (oncoming nurse) by Laurel Ray RN (offgoing nurse). Report included the following information SBAR, Kardex, Intake/Output, MAR, Accordion, Recent Results and Cardiac Rhythm NSR.

## 2020-09-04 NOTE — PROGRESS NOTES
Comprehensive Nutrition Assessment Type and Reason for Visit: Urban Blare Nutrition Recommendations/Plan:   
Benefiber tid if continues with frequent BM's 
 
Continue to hold bowel regimen Reduce water flush to 150 ml q 4 hr once sodium WNL Nutrition Assessment:   
Pt admitted with Septic shock. PMHx: CVA, RA, OA, malignant tumor supraglottis, constipation. Sepsis with shock-was requiring pressor, shock now resolved. Acute hypoxic respiratory failure-intubated on admission; failing daily SBT's. CASEY-improved with IVF. COVID 19 ruled out. Mr Wilbert Chandler was on a mechanically altered diet with thin liquids PTA. Per daughter pt appears to have lost some weight since being in the hospital (at [facility]). Does not know his UBW. Pt has been a drinker since she was a child but he has \"slowed down a lot\". Sodium slowly trending down since water flush increased to 250 ml q 4 hr; now close to normal. Phosphorus WNL after requiring daily replacement. MVI and B1 ordered d/t concern of possible refeeding syndrome. Potassium replaced today. Continue to monitor lytes daily and replace prn. RN reports pt having frequent bowel movements despite Colace being held since yesterday. First BM since being hospitalized 8/30 was yesterday so maybe \"catching up\". May wish to add Benefiber tid if pt continues to have frequent BM's (none since noon). Diprivan currently running @ 6.2 ml/hr which will provide 164 lipid calories per day. Tube feeding is providing 960 ml, 1560 calories (1725 including Diprivan), 90 gm protein and 2350 ml free water (tube feeding/flush) per day to met % estimated energy and protein needs, respectively. Recommend reducing water flush to 150 ml q 4 hr once sodium WNL. Malnutrition Assessment: 
Malnutrition Status:  Insufficient data Nutritionally Significant Medications:  
Diprivan, Albumin x 1, Colace, correction scale insulin, MVI, B1, KCL Estimated Daily Nutrient Needs: Energy (kcal):  [address] [facility] LOVE 2003b, 73 kg) Protein (g):  88-95 (1.2-1.3g/kg) Fluid (ml/day):  1 ml/kcal 
 
Nutrition Related Findings:  Last BM 9/4. Edema 1+ genital, BLE, 2+ BUE. Wounds:  Deep tissue injury Current Nutrition Therapies:  
Diet: NPO Tube Feeding: Osmolite 1.5 @ 40 ml/hr with 1 packet Prosoruce bid and 250 ml water flush q 4 hr Anthropometric Measures: 
· Height:  5' 7\" (170.2 cm) · Current Body Wt:  73.5 kg (162 lb 0.6 oz) · Admission Body Wt:  152 lb 12.5 oz · Ideal Body Wt:   :  109.6 % · BMI:  Overweight (BMI 25.0-29. 9) Wt Readings from Last 10 Encounters:  
09/03/20 73.5 kg (162 lb) 12/27/12 77.1 kg (170 lb) Nutrition Diagnosis:  
· Inadequate oral intake related to impaired respiratory function as evidenced by nutrition support-enteral nutrition, intubation. Nutrition Interventions:  
Food and/or Nutrient Delivery: Continue tube feeding Nutrition Education and Counseling: No recommendations at this time Coordination of Nutrition Care: Continued inpatient monitoring, Interdisciplinary rounds Goals: 
Tube feeding to meet 85% estimated needs x 5-7 days. Nutrition Monitoring and Evaluation:  
Food/Nutrient Intake Outcomes: Enteral nutrition intake/tolerance Physical Signs/Symptoms Outcomes: Biochemical data, Hemodynamic status, GI status, Fluid status or edema, Weight Discharge Planning: Too soon to determine Chico Lyles RD CNSC Contact: Perfect Serve

## 2020-09-04 NOTE — PROGRESS NOTES
09/04/20 1000 Weaning Parameters Spontaneous Breathing Trial Complete Yes 
(patient placed on SBT by Dr. Anais Steel) Resp Rate Observed 36 Ve 10.4  RSBI 102 RT not notified that patient was on spontaneous mode. According to RN patient was on SBT for about 45 mins (not sure either) Notified by RN that patient needed to go back on a rate per MD. 
 
Placed patient back on previous vent settings at this time.

## 2020-09-05 NOTE — PROGRESS NOTES
[facility] 
 
ICU TEAM Progress Note Name: Trinity Pierre : 1960 MRN: [id number] Date: 2020 I Subjective:  
Progress Note: 2020 Reason for ICU Admission: Respiratory failure. Presented in septic shock Interval history: 53-year-old gentleman nursing home resident who recently was at [address],[address] 3[address] Floors after sustaining a large stroke left him with left hemiplegia.  After few weeks of that incident he was transferred out to a nursing home where he was found poorly responsive after receiving pain medication.  Transferred to our ER where we had to be intubated and was found to be hypotensive and septic shock pressors initiated as well as broad-spectrum antibiotic.  Hemodynamically improved and norepinephrine was discontinued afternoon on .  His renal function also improved and back to baseline. Overnight Events:  
No acute event overnight. Failed SBT yesterday due to tachypnea and tachycardia. Spiked fever and blood culture was repeated. Tolerating tube feeding. This morning off sedation remained calm. Active Problem List:  
 
Problem List  Never Reviewed Codes Class ARF (acute renal failure) ([facility]) ICD-10-CM: N17.9 ICD-9-CM: 584.9 Leukocytosis ICD-10-CM: Y96.975 ICD-9-CM: 288.60 Acute respiratory failure with hypoxemia ([facility]) ICD-10-CM: J96.01 
ICD-9-CM: 518.81   
   
 * (Principal) Septic shock ([facility]) ICD-10-CM: A41.9, R65.21 ICD-9-CM: 038.9, 785.52, 995.92 Past Medical History:  
 
 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 Past Surgical History:  
 
 has a past surgical history that includes hx orthopaedic and hx appendectomy. Home Medications:  
 
Prior to Admission medications Medication Sig Start Date End Date Taking?  Authorizing Provider  
acetaminophen (TYLENOL) 325 mg tablet Take 650 mg by mouth every four (4) hours as needed for Pain. Yes Provider, Historical  
albuterol (PROVENTIL VENTOLIN) 2.5 mg /3 mL (0.083 %) nebu 2.5 mg by Nebulization route every six (6) hours as needed for Wheezing. Yes Provider, Historical  
amLODIPine (NORVASC) 10 mg tablet Take 10 mg by mouth daily. Yes Provider, Historical  
atorvastatin (LIPITOR) 40 mg tablet Take 40 mg by mouth nightly. Yes Provider, Historical  
calcium carbonate (OS-JOE) 500 mg calcium (1,250 mg) tablet Take 1 Tab by mouth three (3) times daily. Yes Provider, Historical  
diclofenac (VOLTAREN) 1 % gel Apply 2 g to affected area two (2) times a day. Yes Provider, Historical  
folic acid (FOLVITE) 1 mg tablet Take 1 mg by mouth daily. Yes Provider, Historical  
furosemide (LASIX) 20 mg tablet Take 20 mg by mouth daily. For edema   Yes Provider, Historical  
guaiFENesin-codeine (guaiFENesin AC) 100-10 mg/5 mL solution Take 10 mL by mouth every four (4) hours as needed for Cough. Yes Provider, Historical  
guar gum (BENEFIBER) packet Take 1 Packet by mouth every eight (8) hours as needed for Diarrhea. Yes Provider, Historical  
heparin sodium,porcine (heparin, porcine,) 5,000 unit/mL injection 5,000 Units by SubCUTAneous route every eight (8) hours. Yes Provider, Historical  
albuterol-ipratropium (DUO-NEB) 2.5 mg-0.5 mg/3 ml nebu 3 mL by Nebulization route every six (6) hours as needed for Wheezing or Other (O2 desats). Yes Provider, Historical  
lisinopriL (PRINIVIL, ZESTRIL) 10 mg tablet Take 10 mg by mouth daily. Yes Provider, Historical  
methocarbamoL (ROBAXIN) 500 mg tablet Take 1,000 mg by mouth every six (6) hours. Yes Provider, Historical  
therapeutic multivitamin (THERAGRAN) tablet Take 1 Tab by mouth daily. Yes Provider, Historical  
ondansetron hcl (ZOFRAN) 4 mg tablet Take 4 mg by mouth every six (6) hours as needed for Nausea or Nausea or Vomiting.    Yes Provider, Historical  
 thiamine HCL (B-1) 100 mg tablet Take 100 mg by mouth daily. Yes Provider, Historical  
traMADoL (ULTRAM) 50 mg tablet Take 50 mg by mouth every six (6) hours as needed for Pain. Yes Provider, Historical  
traMADoL (ULTRAM) 50 mg tablet Take 50 mg by mouth every six (6) hours. Yes Provider, Historical  
traZODone (DESYREL) 50 mg tablet Take 50 mg by mouth nightly. Yes Provider, Historical  
 
 
Allergies/Social/Family History: Allergies Allergen Reactions  Hydrocodone Rash and Nausea and Vomiting Social History Tobacco Use  Smoking status: Current Every Day Smoker Packs/day: 1.00 Substance Use Topics  Alcohol use: Yes Comment: 12 pack every 2 days History reviewed. No pertinent family history. Review of Systems:  
 
Not able to obtain as he is sedated intubated Objective:  
Vital Signs: 
Visit Vitals /72 Pulse 81 Temp 97.6 °F (36.4 °C) Resp 16 Ht 5' 7\" (1.702 m) Wt 77.4 kg (170 lb 10.2 oz) SpO2 98% BMI 26.73 kg/m² O2 Flow Rate (L/min): 15 l/min O2 Device: Endotracheal tube, Ventilator Temp (24hrs), Av.6 °F (37.6 °C), Min:97.6 °F (36.4 °C), Max:101.3 °F (38.5 °C) Intake/Output:  
 
Intake/Output Summary (Last 24 hours) at 2020 5113 Last data filed at 2020 0700 Gross per 24 hour Intake 3012.97 ml Output 1680 ml Net 1332.97 ml Physical Exam: 
General:  intubated, well developed, appears stated age Eyes:  Sclera anicteric. Pupils equally round and reactive to light. Mouth/Throat: Mucous membranes normal, ET tube Neck: Supple, central line removed Lungs:   Clear to auscultation bilaterally, good effort CV:  Regular rate and rhythm,no murmur, click, rub or gallop Abdomen:   Soft, non-tender. bowel sounds normal. non-distended Extremities: No cyanosis or edema Skin:  Sacral deep tissue injury, no open skin.  Followed by wound care Lymph nodes: Cervical and supraclavicular normal  
 Musculoskeletal: No swelling or deformity Lines/Devices:  Intact, no erythema, drainage or tenderness Psych:  Off sedation on mechanical ventilation, calm, open eyes spontaneously, seems to follow some commands using his right side. Dense hemiplegia on the left side LABS AND  DATA: Personally reviewed Recent Labs  
  09/05/20 0515 09/04/20 
0559 WBC 9.8 10.2 HGB 10.3* 9.8* HCT 32.3* 30.4*  218 Recent Labs  
  09/05/20 
0515 09/04/20 
0559  147* K 3.4* 3.1*  
* 115* CO2 29 28 BUN 14 13 CREA 0.71 0.67* * 109* CA 7.6* 7.7* MG 2.0 1.9 PHOS 2.4* 2.9 Recent Labs  
  09/05/20 0515 09/04/20 
0559 AP 57  --   
TP 5.3*  --   
ALB 1.6* 1.6*  
GLOB 3.7  --   
 
Recent Labs  
  09/05/20 
0515 09/04/20 
0559 INR 1.0 1.0 PTP 10.2 10.3 Recent Labs  
  09/05/20 
0428 09/04/20 
6910 PHI 7.44 7.45  
PCO2I 39.5 39.2 PO2I 83 84 FIO2I 24 24 No results for input(s): CPK, CKMB, TROIQ, BNPP in the last 72 hours. Hemodynamics:  
PAP:   CO:    
Wedge:   CI:    
CVP:    SVR:    
  PVR:    
 
Ventilator Settings: 
Mode Rate Tidal Volume Pressure FiO2 PEEP Assist control, VC+   480 ml  8 cm H2O 24 % 5 cm H20 Peak airway pressure: 16 cm H2O Minute ventilation: 7.2 l/min MEDS: Reviewed Chest X-Ray: CXR Results  (Last 48 hours) None Assessment and Plan: Septic shock:: Resolved  Acute hypoxic hypercapnic respiratory failure: Likely due to pneumonia/aspiration  COVID-19 ruled out  Acute kidney injury:: Resolved  Recent history of ischemic stroke with residual left hemiplegia 
  
Continue Zosyn,  vancomycin was discontinued.    Culture remain negative but he still spiking fever, repeat a pair of blood culture on September 4 still pending.  Off pressors for more than 24-hour  Discussed with his daughter today, plan is to extubate him on Monday when she can come and visit with understanding that he will not be intubated again. At that time his CODE STATUS will be changed. She understand that he had significant neurological deficit from the recent stroke and she acknowledged that he does not want to live with a tracheostomy or feeding tube.  Kidney function back to baseline.  Hypernatremia resolved. Replace electrolytes as indicated  Tolerating tube feeding  
  
DVT and GI prophylaxis.  Ventilator bundle. 
  
 
DISPOSITION Stay in ICU CRITICAL CARE CONSULTANT NOTE I had a face to face encounter with the patient, reviewed and interpreted patient data including clinical events, labs, images, vital signs, I/O's, and examined patient. I have discussed the case and the plan and management of the patient's care with the consulting services, the bedside nurses and the respiratory therapist.   
 
NOTE OF PERSONAL INVOLVEMENT IN CARE This patient has a high probability of imminent, clinically significant deterioration, which requires the highest level of preparedness to intervene urgently. I participated in the decision-making and personally managed or directed the management of the following life and organ supporting interventions that required my frequent assessment to treat or prevent imminent deterioration. I personally spent 40 minutes of critical care time. This is time spent at this critically ill patient's bedside actively involved in patient care as well as the coordination of care and discussions with the patient's family. This does not include any procedural time which has been billed separately. Sandip Merritt M.D. Staff Intensivist/Pulmonologist 
[facility] 
9/5/2020

## 2020-09-05 NOTE — PROGRESS NOTES
Problem: Non-Violent Restraints Goal: *Removal from restraints as soon as assessed to be safe Outcome: Progressing Towards Goal 
Goal: *No harm/injury to patient while restraints in use Outcome: Progressing Towards Goal 
Goal: *Patient's dignity will be maintained Outcome: Progressing Towards Goal 
Goal: *Patient Specific Goal (EDIT GOAL, INSERT TEXT) Outcome: Progressing Towards Goal 
Goal: Non-violent Restaints:Standard Interventions Outcome: Progressing Towards Goal 
Goal: Non-violent Restraints:Patient Interventions Outcome: Progressing Towards Goal 
Goal: Patient/Family Education Outcome: Progressing Towards Goal 
  
Problem: Falls - Risk of 
Goal: *Absence of Falls Description: Document Naman Castillo Fall Risk and appropriate interventions in the flowsheet. Outcome: Progressing Towards Goal 
Note: Fall Risk Interventions: 
Mobility Interventions: Communicate number of staff needed for ambulation/transfer, PT Consult for mobility concerns, Strengthening exercises (ROM-active/passive) Mentation Interventions: Adequate sleep, hydration, pain control, Door open when patient unattended, Evaluate medications/consider consulting pharmacy, Familiar objects from home, Family/sitter at bedside, Increase mobility, More frequent rounding, Reorient patient, Room close to nurse's station, Toileting rounds, Update white board Medication Interventions: Evaluate medications/consider consulting pharmacy Elimination Interventions: Stay With Me (per policy), Toilet paper/wipes in reach, Toileting schedule/hourly rounds History of Falls Interventions: Bed/chair exit alarm, Door open when patient unattended, Evaluate medications/consider consulting pharmacy, Investigate reason for fall, Room close to nurse's station Problem: Patient Education: Go to Patient Education Activity Goal: Patient/Family Education Outcome: Progressing Towards Goal 
  
Problem: Pressure Injury - Risk of 
 Goal: *Prevention of pressure injury Description: Document Jay Scale and appropriate interventions in the flowsheet. Outcome: Progressing Towards Goal 
Note: Pressure Injury Interventions: 
Sensory Interventions: Assess changes in LOC, Assess need for specialty bed, Avoid rigorous massage over bony prominences, Check visual cues for pain, Discuss PT/OT consult with provider, Float heels, Keep linens dry and wrinkle-free, Maintain/enhance activity level, Minimize linen layers, Monitor skin under medical devices, Pad between skin to skin, Pressure redistribution bed/mattress (bed type), Turn and reposition approx. every two hours (pillows and wedges if needed), Use 30-degree side-lying position Moisture Interventions: Absorbent underpads, Apply protective barrier, creams and emollients, Assess need for specialty bed, Check for incontinence Q2 hours and as needed, Internal/External fecal devices, Internal/External urinary devices, Limit adult briefs, Maintain skin hydration (lotion/cream), Minimize layers, Moisture barrier Activity Interventions: Assess need for specialty bed, Pressure redistribution bed/mattress(bed type), PT/OT evaluation Mobility Interventions: Assess need for specialty bed, Float heels, HOB 30 degrees or less, Pressure redistribution bed/mattress (bed type), PT/OT evaluation, Turn and reposition approx. every two hours(pillow and wedges) Nutrition Interventions: Document food/fluid/supplement intake, Discuss nutritional consult with provider, Offer support with meals,snacks and hydration Friction and Shear Interventions: Apply protective barrier, creams and emollients, Feet elevated on foot rest, Foam dressings/transparent film/skin sealants, HOB 30 degrees or less, Lift sheet, Lift team/patient mobility team, Minimize layers Problem: Patient Education: Go to Patient Education Activity Goal: Patient/Family Education Outcome: Progressing Towards Goal 
  
 Problem: Nutrition Deficit Goal: *Optimize nutritional status Outcome: Progressing Towards Goal

## 2020-09-05 NOTE — PROGRESS NOTES
1930: Bedside shift change report given to Jannet Litten, RN (oncoming nurse) by Alayna Woodall RN (offgoing nurse). Report included the following information SBAR, Kardex, Intake/Output, MAR, Accordion, Recent Results and Cardiac Rhythm NSR. Dual neuro assessment completed 2251-continues to have watery brown stools/keke rectal/rectum/post scrotum  Red denuded and excoriated- Foam dressing remains intact to coccyx/sacrum covering pressure area POA 
2303-NP Evans updated/order received for flexiseal 
0058-HR briefly in the 130's ST-pt febrile 100.4 ax and tylenol given 
0100-NSR 87 
 
 
0730-bedside report given to RN using sbar format

## 2020-09-05 NOTE — PROGRESS NOTES
2175: Tube feed and sedation paused for SBT 
1200: TF restarted, flush interval changed to q6hr per Dr. Brooklynn Lacy.

## 2020-09-06 NOTE — PROGRESS NOTES
[facility] 
 
ICU TEAM Progress Note Name: Dion Busch : 1960 MRN: [id number] Date: 2020 I Subjective:  
Progress Note: 2020 Reason for ICU Admission: Respiratory failure. Presented in septic shock Interval history: 78-year-old gentleman nursing home resident who recently was at [address] [facility],3W & 3E Floors after sustaining a large stroke left him with left hemiplegia.  After few weeks of that incident he was transferred out to a nursing home where he was found poorly responsive after receiving pain medication.  Transferred to our ER where we had to be intubated and was found to be hypotensive and septic shock pressors initiated as well as broad-spectrum antibiotic.  Hemodynamically improved and norepinephrine was discontinued afternoon on .  His renal function also improved and back to baseline. Overnight Events:  
No acute event overnight. Tolerating tube feeding. This morning off sedation remained calm. Does not follow commands, he does looks around and looks comfortable. Active Problem List:  
 
Problem List  Never Reviewed Codes Class ARF (acute renal failure) ([facility]) ICD-10-CM: N17.9 ICD-9-CM: 584.9 Leukocytosis ICD-10-CM: Y48.886 ICD-9-CM: 288.60 Acute respiratory failure with hypoxemia ([facility]) ICD-10-CM: J96.01 
ICD-9-CM: 518.81   
   
 * (Principal) Septic shock ([facility]) ICD-10-CM: A41.9, R65.21 ICD-9-CM: 038.9, 785.52, 995.92 Past Medical History:  
 
 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 Past Surgical History:  
 
 has a past surgical history that includes hx orthopaedic and hx appendectomy. Home Medications:  
 
Prior to Admission medications Medication Sig Start Date End Date Taking? Authorizing Provider  
acetaminophen (TYLENOL) 325 mg tablet Take 650 mg by mouth every four (4) hours as needed for Pain.    Yes Provider, Historical  
 albuterol (PROVENTIL VENTOLIN) 2.5 mg /3 mL (0.083 %) nebu 2.5 mg by Nebulization route every six (6) hours as needed for Wheezing. Yes Provider, Historical  
amLODIPine (NORVASC) 10 mg tablet Take 10 mg by mouth daily. Yes Provider, Historical  
atorvastatin (LIPITOR) 40 mg tablet Take 40 mg by mouth nightly. Yes Provider, Historical  
calcium carbonate (OS-JOE) 500 mg calcium (1,250 mg) tablet Take 1 Tab by mouth three (3) times daily. Yes Provider, Historical  
diclofenac (VOLTAREN) 1 % gel Apply 2 g to affected area two (2) times a day. Yes Provider, Historical  
folic acid (FOLVITE) 1 mg tablet Take 1 mg by mouth daily. Yes Provider, Historical  
furosemide (LASIX) 20 mg tablet Take 20 mg by mouth daily. For edema   Yes Provider, Historical  
guaiFENesin-codeine (guaiFENesin AC) 100-10 mg/5 mL solution Take 10 mL by mouth every four (4) hours as needed for Cough. Yes Provider, Historical  
guar gum (BENEFIBER) packet Take 1 Packet by mouth every eight (8) hours as needed for Diarrhea. Yes Provider, Historical  
heparin sodium,porcine (heparin, porcine,) 5,000 unit/mL injection 5,000 Units by SubCUTAneous route every eight (8) hours. Yes Provider, Historical  
albuterol-ipratropium (DUO-NEB) 2.5 mg-0.5 mg/3 ml nebu 3 mL by Nebulization route every six (6) hours as needed for Wheezing or Other (O2 desats). Yes Provider, Historical  
lisinopriL (PRINIVIL, ZESTRIL) 10 mg tablet Take 10 mg by mouth daily. Yes Provider, Historical  
methocarbamoL (ROBAXIN) 500 mg tablet Take 1,000 mg by mouth every six (6) hours. Yes Provider, Historical  
therapeutic multivitamin (THERAGRAN) tablet Take 1 Tab by mouth daily. Yes Provider, Historical  
ondansetron hcl (ZOFRAN) 4 mg tablet Take 4 mg by mouth every six (6) hours as needed for Nausea or Nausea or Vomiting. Yes Provider, Historical  
thiamine HCL (B-1) 100 mg tablet Take 100 mg by mouth daily.    Yes Provider, Historical  
 traMADoL (ULTRAM) 50 mg tablet Take 50 mg by mouth every six (6) hours as needed for Pain. Yes Provider, Historical  
traMADoL (ULTRAM) 50 mg tablet Take 50 mg by mouth every six (6) hours. Yes Provider, Historical  
traZODone (DESYREL) 50 mg tablet Take 50 mg by mouth nightly. Yes Provider, Historical  
 
 
Allergies/Social/Family History: Allergies Allergen Reactions  Hydrocodone Rash and Nausea and Vomiting Social History Tobacco Use  Smoking status: Current Every Day Smoker Packs/day: 1.00 Substance Use Topics  Alcohol use: Yes Comment: 12 pack every 2 days History reviewed. No pertinent family history. Review of Systems:  
 
Not able to obtain as he is sedated intubated Objective:  
Vital Signs: 
Visit Vitals /79 Pulse 82 Temp 98.5 °F (36.9 °C) Resp 17 Ht 5' 7\" (1.702 m) Wt 78.5 kg (173 lb 1 oz) SpO2 98% BMI 27.11 kg/m² O2 Flow Rate (L/min): 15 l/min O2 Device: Endotracheal tube, Ventilator Temp (24hrs), Av.3 °F (37.4 °C), Min:98.5 °F (36.9 °C), Max:100.8 °F (38.2 °C) Intake/Output:  
 
Intake/Output Summary (Last 24 hours) at 2020 1130 Last data filed at 2020 1000 Gross per 24 hour Intake 2884.87 ml Output 3255 ml Net -370.13 ml Physical Exam: 
General:  Intubated, well developed, appears stated age Eyes:  Sclera anicteric. Pupils equally round and reactive to light. Mouth/Throat: Mucous membranes normal, ET tube Neck: Supple, central line removed Lungs:   Clear to auscultation bilaterally, good effort CV:  Regular rate and rhythm,no murmur, click, rub or gallop Abdomen:   Soft, non-tender. bowel sounds normal. non-distended Extremities: No cyanosis or edema Skin:  Sacral deep tissue injury, no open skin.  Followed by wound care Lymph nodes: Cervical and supraclavicular normal  
Musculoskeletal: No swelling or deformity Lines/Devices:  Intact, no erythema, drainage or tenderness Psych:  Off sedation on mechanical ventilation, calm, open eyes spontaneously, seems to follow some commands using his right side. Dense hemiplegia on the left side LABS AND  DATA: Personally reviewed Recent Labs  
  09/06/20 0349 09/05/20 
0515 WBC 11.1 9.8 HGB 11.0* 10.3* HCT 33.7* 32.3*  
 220 Recent Labs  
  09/06/20 0349 09/05/20 
0515  144  
K 3.8 3.4*  
 111* CO2 27 29 BUN 13 14 CREA 0.70 0.71  105* CA 8.0* 7.6*  
MG 2.1 2.0 PHOS 2.4* 2.4* Recent Labs  
  09/06/20 0349 09/05/20 
0515 AP 59 57  
TP 5.6* 5.3* ALB 1.6* 1.6*  
GLOB 4.0 3.7 Recent Labs  
  09/06/20 0349 09/05/20 
0515 INR 1.0 1.0 PTP 10.2 10.2 Recent Labs  
  09/05/20 
0428 09/04/20 
8067 PHI 7.44 7.45  
PCO2I 39.5 39.2 PO2I 83 84 FIO2I 24 24 No results for input(s): CPK, CKMB, TROIQ, BNPP in the last 72 hours. Hemodynamics:  
PAP:   CO:    
Wedge:   CI:    
CVP:    SVR:    
  PVR:    
 
Ventilator Settings: 
Mode Rate Tidal Volume Pressure FiO2 PEEP Assist control, VC+   480 ml  8 cm H2O 24 % 5 cm H20 Peak airway pressure: 17 cm H2O Minute ventilation: 8.95 l/min MEDS: Reviewed Chest X-Ray: CXR Results  (Last 48 hours) None Assessment and Plan: Septic shock:: Resolved  Acute hypoxic hypercapnic respiratory failure: Likely due to pneumonia/aspiration  COVID-19 ruled out  Acute kidney injury:: Resolved  Recent history of ischemic stroke with residual left hemiplegia 
  
Continue Zosyn,  vancomycin was discontinued. Culture remain negative but he still spiking fever, repeat a pair of blood culture on September 4 still pending.  Off pressors for more than 24-hour  Daughter wanted to extubate him on Monday when she can come and visit with understanding that he will not be intubated again. At that time his CODE STATUS will be changed.   She understand that he had significant neurological deficit from the recent stroke and she acknowledged that he does not want to live with a tracheostomy or feeding tube.  Kidney function back to baseline.  Hypernatremia resolved. Replace electrolytes as indicated  Tolerating tube feeding  
  
DVT and GI prophylaxis.  Ventilator bundle. 
  
 
DISPOSITION Stay in ICU CRITICAL CARE CONSULTANT NOTE I had a face to face encounter with the patient, reviewed and interpreted patient data including clinical events, labs, images, vital signs, I/O's, and examined patient. I have discussed the case and the plan and management of the patient's care with the consulting services, the bedside nurses and the respiratory therapist.   
 
NOTE OF PERSONAL INVOLVEMENT IN CARE This patient has a high probability of imminent, clinically significant deterioration, which requires the highest level of preparedness to intervene urgently. I participated in the decision-making and personally managed or directed the management of the following life and organ supporting interventions that required my frequent assessment to treat or prevent imminent deterioration. I personally spent 40 minutes of critical care time. This is time spent at this critically ill patient's bedside actively involved in patient care as well as the coordination of care and discussions with the patient's family. This does not include any procedural time which has been billed separately. Svetlana Bentley MD 
[address] 9/6/2020

## 2020-09-06 NOTE — PROGRESS NOTES
0030: TRANSFER - IN REPORT: 
 
Verbal report received from Sarah Beth, [facility] (name) on Benigno West  being received from ICU [id number] (unit) for routine progression of care Report consisted of patients Situation, Background, Assessment and  
Recommendations(SBAR). Information from the following report(s) SBAR, Kardex, ED Summary, Procedure Summary, Intake/Output, MAR, Recent Results, Med Rec Status and Cardiac Rhythm NSR was reviewed with the receiving nurse. Opportunity for questions and clarification was provided. Assessment completed upon patients arrival to unit and care assumed. 0100: Pt arrived to unit, VSS. CHG bath provided. Primary Nurse Mario Nam and Win Sun RN performed a dual skin assessment on this patient Impairment noted- see wound doc flow sheet L lateral foot: non-blanchable redness R lateral foot: non-blanchable redness Sacrum: DTI, purple, non-blanchable Groin: multiple scabs/bruises Current Bed: [facility] with [facility] mattress & blower Jay score is 11 
 
0400: AM labs drawn and sent  
 
0500: K+ 3.8, Phos 2.4, replaced per protocol 0530: Pt sustained tachycardia 134, Dr. Leighann Bocanegra notified, orders to give PRN tylenol, notify if pt does not break rhythm  
 
0640: Pt back in NSR 80's 0730: Bedside and Verbal shift change report given to Charito Chapa RN (oncoming nurse) by Dorita Retana RN (offgoing nurse). Report included the following information SBAR, Kardex, ED Summary, Procedure Summary, Intake/Output, MAR, Recent Results, Med Rec Status and Cardiac Rhythm NSR.

## 2020-09-06 NOTE — PROGRESS NOTES
Problem: Breathing Pattern - Ineffective Goal: *Absence of hypoxia Outcome: Progressing Towards Goal 
Goal: *Use of effective breathing techniques Outcome: Progressing Towards Goal 
  
Problem: Patient Education: Go to Patient Education Activity Goal: Patient/Family Education Outcome: Progressing Towards Goal 
  
Problem: Ventilator Management Goal: *Adequate oxygenation and ventilation Outcome: Progressing Towards Goal 
Goal: *Patient maintains clear airway/free of aspiration Outcome: Progressing Towards Goal 
Goal: *Absence of infection signs and symptoms Outcome: Progressing Towards Goal 
Goal: *Normal spontaneous ventilation Outcome: Progressing Towards Goal

## 2020-09-06 NOTE — PROGRESS NOTES
2000:Bedside and Verbal shift change report given to Romina Nicole RN (oncoming nurse) by Shikha Wilson RN (offgoing nurse). Report included the following information SBAR, Kardex, Intake/Output, MAR, Accordion, Recent Results, Med Rec Status and Cardiac Rhythm nsr. 0030: TRANSFER - OUT REPORT: 
 
Verbal report given to CCU(name) on Guido Rump  being transferred to CCU(unit) for routine progression of care Report consisted of patients Situation, Background, Assessment and  
Recommendations(SBAR). Information from the following report(s) SBAR, Kardex, Intake/Output, MAR, Accordion, Recent Results, Med Rec Status and Cardiac Rhythm NSR was reviewed with the receiving nurse. Lines: PICC Triple Lumen 42/35/63 Right;Basilic (Active) Central Line Being Utilized Yes 09/05/20 2000 Criteria for Appropriate Use Limited/no vessel suitable for conventional peripheral access 09/05/20 2000 Site Assessment Clean, dry, & intact 09/05/20 2000 Phlebitis Assessment 0 09/05/20 2000 Infiltration Assessment 0 09/05/20 2000 Arm Circumference (cm) 28 cm 09/02/20 1310 Date of Last Dressing Change 09/02/20 09/05/20 2000 Dressing Status Clean, dry, & intact 09/05/20 2000 External Catheter Length (cm) 1 centimeters 09/02/20 1310 Dressing Type Disk with Chlorhexadine gluconate (CHG); Transparent 09/05/20 2000 Action Taken Open ports on tubing capped 09/05/20 2000 Hub Color/Line Status Gray;Flushed;Patent;Capped 09/05/20 2000 Positive Blood Return (Site #1) Yes 09/05/20 2000 Hub Color/Line Status Red; Infusing 09/05/20 2000 Positive Blood Return (Site #2) Yes 09/05/20 2000 Hub Color/Line Status White; Infusing 09/05/20 2000 Positive Blood Return (Site #3) Yes 09/05/20 2000 Alcohol Cap Used Yes 09/05/20 2000 Opportunity for questions and clarification was provided. Patient transported with: 
 Monitor O2 @ on vent liters Registered Nurse Tech

## 2020-09-06 NOTE — PROGRESS NOTES
Problem: Breathing Pattern - Ineffective Goal: *Absence of hypoxia 9/6/2020 0159 by Layla Villagran Outcome: Progressing Towards Goal 
9/6/2020 0158 by Layla Villagran Outcome: Progressing Towards Goal 
Goal: *Use of effective breathing techniques 9/6/2020 0159 by Layla Villagran Outcome: Progressing Towards Goal 
9/6/2020 0158 by Layla Villagran Outcome: Progressing Towards Goal 
Goal: *PALLIATIVE CARE:  Alleviation of Dyspnea 9/6/2020 0159 by Layla Villagran Outcome: Progressing Towards Goal 
9/6/2020 0158 by Layla Villagran Outcome: Progressing Towards Goal 
  
Problem: Patient Education: Go to Patient Education Activity Goal: Patient/Family Education 9/6/2020 0159 by Layla Villagran Outcome: Progressing Towards Goal 
9/6/2020 0158 by Layla Villagran Outcome: Progressing Towards Goal 
  
Problem: Ventilator Management Goal: *Adequate oxygenation and ventilation 9/6/2020 0159 by Layla Villagran Outcome: Progressing Towards Goal 
9/6/2020 0158 by Layla Villagran Outcome: Progressing Towards Goal 
Goal: *Patient maintains clear airway/free of aspiration 9/6/2020 0159 by Layla Villagran Outcome: Progressing Towards Goal 
9/6/2020 0158 by Layla Villagran Outcome: Progressing Towards Goal 
Goal: *Absence of infection signs and symptoms 9/6/2020 0159 by Layla Villagran Outcome: Progressing Towards Goal 
9/6/2020 0158 by Layla Villagran Outcome: Progressing Towards Goal 
Goal: *Normal spontaneous ventilation 9/6/2020 0159 by Layla Villagran Outcome: Progressing Towards Goal 
9/6/2020 0158 by Layla Villagran Outcome: Progressing Towards Goal 
  
Problem: Patient Education: Go to Patient Education Activity Goal: Patient/Family Education 9/6/2020 0159 by Layla Villagran Outcome: Progressing Towards Goal 
9/6/2020 0158 by Layla Villagran Outcome: Progressing Towards Goal 
  
Problem: Risk for Spread of Infection Goal: Prevent transmission of infectious organism to others Description: Prevent the transmission of infectious organisms to other patients, staff members, and visitors. 9/6/2020 0159 by Anaya Pike Outcome: Progressing Towards Goal 
9/6/2020 0158 by Anaya Pike Outcome: Progressing Towards Goal 
  
Problem: Patient Education:  Go to Education Activity Goal: Patient/Family Education 9/6/2020 0159 by Anaya Pike Outcome: Progressing Towards Goal 
9/6/2020 0158 by Anaya Pike Outcome: Progressing Towards Goal 
  
Problem: Non-Violent Restraints Goal: *Removal from restraints as soon as assessed to be safe 9/6/2020 0159 by Anaya Pike Outcome: Progressing Towards Goal 
9/6/2020 0158 by Anaya Pike Outcome: Progressing Towards Goal 
Goal: *No harm/injury to patient while restraints in use 9/6/2020 0159 by Anaya Pike Outcome: Progressing Towards Goal 
9/6/2020 0158 by Anaya Pike Outcome: Progressing Towards Goal 
Goal: *Patient's dignity will be maintained 9/6/2020 0159 by Anaya Pike Outcome: Progressing Towards Goal 
9/6/2020 0158 by Anaya Pike Outcome: Progressing Towards Goal 
Goal: *Patient Specific Goal (EDIT GOAL, INSERT TEXT) 
9/6/2020 0159 by Anaya Pike Outcome: Progressing Towards Goal 
9/6/2020 0158 by Anaya Pike Outcome: Progressing Towards Goal 
Goal: Non-violent Restaints:Standard Interventions 9/6/2020 0159 by Anaya Pike Outcome: Progressing Towards Goal 
9/6/2020 0158 by Anaya Pike Outcome: Progressing Towards Goal 
Goal: Non-violent Restraints:Patient Interventions 9/6/2020 0159 by Anaya Pike Outcome: Progressing Towards Goal 
9/6/2020 0158 by Anaya Pike Outcome: Progressing Towards Goal 
Goal: Patient/Family Education 9/6/2020 0159 by Anaya Pike Outcome: Progressing Towards Goal 
9/6/2020 0158 by Anaya Pike Outcome: Progressing Towards Goal 
  
Problem: Falls - Risk of 
Goal: *Absence of Falls Description: Document Rakel Berumen Fall Risk and appropriate interventions in the flowsheet. 9/6/2020 0159 by Rodo Skaggs Outcome: Progressing Towards Goal 
Note: Fall Risk Interventions: 
Mobility Interventions: Communicate number of staff needed for ambulation/transfer, Strengthening exercises (ROM-active/passive) Mentation Interventions: Adequate sleep, hydration, pain control, Door open when patient unattended, Evaluate medications/consider consulting pharmacy, Increase mobility, Reorient patient, More frequent rounding, Update white board, Toileting rounds Medication Interventions: Evaluate medications/consider consulting pharmacy Elimination Interventions: Call light in reach, Toileting schedule/hourly rounds History of Falls Interventions: Consult care management for discharge planning, Evaluate medications/consider consulting pharmacy, Assess for delayed presentation/identification of injury for 48 hrs (comment for end date) 9/6/2020 0158 by Rodo Skaggs Outcome: Progressing Towards Goal 
Note: Fall Risk Interventions: 
Mobility Interventions: Communicate number of staff needed for ambulation/transfer, Strengthening exercises (ROM-active/passive) Mentation Interventions: Adequate sleep, hydration, pain control, Door open when patient unattended, Evaluate medications/consider consulting pharmacy, Increase mobility, Reorient patient, More frequent rounding, Update white board, Toileting rounds Medication Interventions: Evaluate medications/consider consulting pharmacy Elimination Interventions: Call light in reach, Toileting schedule/hourly rounds History of Falls Interventions: Consult care management for discharge planning, Evaluate medications/consider consulting pharmacy, Assess for delayed presentation/identification of injury for 48 hrs (comment for end date) Problem: Patient Education: Go to Patient Education Activity Goal: Patient/Family Education 9/6/2020 0159 by Jatin Moras Outcome: Progressing Towards Goal 
9/6/2020 0158 by Jatin Moras Outcome: Progressing Towards Goal 
  
Problem: Pressure Injury - Risk of 
Goal: *Prevention of pressure injury Description: Document Jay Scale and appropriate interventions in the flowsheet. 9/6/2020 0159 by Jatin Case Outcome: Progressing Towards Goal 
9/6/2020 0158 by Jatin Moras Outcome: Progressing Towards Goal 
  
Problem: Patient Education: Go to Patient Education Activity Goal: Patient/Family Education 9/6/2020 0159 by Jatin Case Outcome: Progressing Towards Goal 
9/6/2020 0158 by Jatin Herringreys Outcome: Progressing Towards Goal 
  
Problem: Nutrition Deficit Goal: *Optimize nutritional status 9/6/2020 0159 by Jatin Moras Outcome: Progressing Towards Goal 
9/6/2020 0158 by Jatinavery GramajoManpreet Outcome: Progressing Towards Goal 
  
Problem: Breathing Pattern - Ineffective Goal: *Absence of hypoxia 9/6/2020 0159 by Jatin Case Outcome: Progressing Towards Goal 
9/6/2020 0158 by Jatin Moras Outcome: Progressing Towards Goal 
Goal: *Use of effective breathing techniques 9/6/2020 0159 by Jatin Moras Outcome: Progressing Towards Goal 
9/6/2020 0158 by Jatinavery GramajoManpreet Outcome: Progressing Towards Goal 
Goal: *PALLIATIVE CARE:  Alleviation of Dyspnea 9/6/2020 0159 by Jatinavery Moras Outcome: Progressing Towards Goal 
9/6/2020 0158 by Jatin Herringreys Outcome: Progressing Towards Goal 
  
Problem: Patient Education: Go to Patient Education Activity Goal: Patient/Family Education 9/6/2020 0159 by Jatin Case Outcome: Progressing Towards Goal 
9/6/2020 0158 by Jatin Moras Outcome: Progressing Towards Goal 
  
Problem: Breathing Pattern - Ineffective Goal: *Absence of hypoxia 9/6/2020 0159 by Jatinavery GramajoManpreet Outcome: Progressing Towards Goal 
9/6/2020 0158 by Jatin Giles Outcome: Progressing Towards Goal 
Goal: *Use of effective breathing techniques 9/6/2020 0159 by Thomass Lalito Outcome: Progressing Towards Goal 
9/6/2020 0158 by Ollis Lalito Outcome: Progressing Towards Goal 
Goal: *PALLIATIVE CARE:  Alleviation of Dyspnea 9/6/2020 0159 by Thomass Reynaldones Outcome: Progressing Towards Goal 
9/6/2020 0158 by Ollis Dienes Outcome: Progressing Towards Goal 
  
Problem: Patient Education: Go to Patient Education Activity Goal: Patient/Family Education 9/6/2020 0159 by Thomass Lalito Outcome: Progressing Towards Goal 
9/6/2020 0158 by Ollis Lalito Outcome: Progressing Towards Goal 
  
Problem: Infection - Risk of, Ventilator-Associated Pneumonia Goal: *Absence of infection signs and symptoms 9/6/2020 0159 by Thomass Lalito Outcome: Progressing Towards Goal 
9/6/2020 0158 by Ollis Lalito Outcome: Progressing Towards Goal 
  
Problem: Patient Education: Go to Patient Education Activity Goal: Patient/Family Education 9/6/2020 0159 by Ananth Starkey Outcome: Progressing Towards Goal 
9/6/2020 0158 by Thomass Lalito Outcome: Progressing Towards Goal 
  
Problem: Hypertension Goal: *Blood pressure within specified parameters 9/6/2020 0159 by Thomass Lalito Outcome: Progressing Towards Goal 
9/6/2020 0158 by Ollis Lalito Outcome: Progressing Towards Goal 
Goal: *Fluid volume balance 9/6/2020 0159 by Thomass Reynaldones Outcome: Progressing Towards Goal 
9/6/2020 0158 by Ollis Lalito Outcome: Progressing Towards Goal 
Goal: *Labs within defined limits 9/6/2020 0159 by Thomass Lalito Outcome: Progressing Towards Goal 
9/6/2020 0158 by Ollis Lalito Outcome: Progressing Towards Goal 
  
Problem: Patient Education: Go to Patient Education Activity Goal: Patient/Family Education 9/6/2020 0159 by Ananth Starkey Outcome: Progressing Towards Goal 
9/6/2020 0158 by Thomass Lalito Outcome: Progressing Towards Goal 
  
Problem: Pain Goal: *Control of Pain 9/6/2020 0159 by Thomass Lalito Outcome: Progressing Towards Goal 
 9/6/2020 0158 by Malina Flynn Outcome: Progressing Towards Goal 
Goal: *PALLIATIVE CARE:  Alleviation of Pain 9/6/2020 0159 by Malina Flynn Outcome: Progressing Towards Goal 
9/6/2020 0158 by Malina Rina Outcome: Progressing Towards Goal 
  
Problem: Patient Education: Go to Patient Education Activity Goal: Patient/Family Education 9/6/2020 0159 by Malina Flynn Outcome: Progressing Towards Goal 
9/6/2020 0158 by Malina Rina Outcome: Progressing Towards Goal 
  
Problem: Non-Violent Restraints Goal: *Removal from restraints as soon as assessed to be safe 9/6/2020 0159 by Malina Flynn Outcome: Progressing Towards Goal 
9/6/2020 0158 by Malina Rina Outcome: Progressing Towards Goal 
Goal: *No harm/injury to patient while restraints in use 9/6/2020 0159 by Malina Flynn Outcome: Progressing Towards Goal 
9/6/2020 0158 by Malina Rina Outcome: Progressing Towards Goal 
Goal: *Patient's dignity will be maintained 9/6/2020 0159 by Malina Flynn Outcome: Progressing Towards Goal 
9/6/2020 0158 by Malina Flynn Outcome: Progressing Towards Goal 
Goal: *Patient Specific Goal (EDIT GOAL, INSERT TEXT) 
9/6/2020 0159 by Malina Flynn Outcome: Progressing Towards Goal 
9/6/2020 0158 by Malina Rina Outcome: Progressing Towards Goal 
Goal: Non-violent Restaints:Standard Interventions 9/6/2020 0159 by Malina Flynn Outcome: Progressing Towards Goal 
9/6/2020 0158 by Malina Flynn Outcome: Progressing Towards Goal 
Goal: Non-violent Restraints:Patient Interventions 9/6/2020 0159 by Malina Flynn Outcome: Progressing Towards Goal 
9/6/2020 0158 by Malina Flynn Outcome: Progressing Towards Goal 
Goal: Patient/Family Education 9/6/2020 0159 by Malina Flynn Outcome: Progressing Towards Goal 
9/6/2020 0158 by Malina Rina Outcome: Progressing Towards Goal 
  
Problem: Infection - Risk of, Urinary Catheter-Associated Urinary Tract Infection Goal: *Absence of infection signs and symptoms Outcome: Progressing Towards Goal 
  
Problem: Patient Education: Go to Patient Education Activity Goal: Patient/Family Education Outcome: Progressing Towards Goal

## 2020-09-07 NOTE — PROGRESS NOTES
Spiritual Care Assessment/Progress Note [address] 
 
 
NAME: Carla Moy      MRN: [id number] AGE: 56 y.o. SEX: male Gnosticist Affiliation: Other Language: [place] 9/7/2020 Spiritual Assessment begun in [facility] 4 CORONARY CARE through conversation with: 
  
    []Patient        [] Family    [] Friend(s) Reason for Consult: Initial/Spiritual assessment, critical care Spiritual beliefs: (Please include comment if needed) 
   [] Identifies with a robert tradition:     
   [] Supported by a robert community:        
   [] Claims no spiritual orientation:       
   [] Seeking spiritual identity:            
   [] Adheres to an individual form of spirituality:       
   [x] Not able to assess:                   
 
    
Identified resources for coping:  
   [] Prayer                           
   [] Music                  [] Guided Imagery 
   [] Family/friends                 [] Pet visits [] Devotional reading                         [] Unknown 
   [] Other:                                          
 
 
Interventions offered during this visit: (See comments for more details) Patient Interventions: Initial visit, Other (comment)(Left note at bedside) Family/Friend(s): Other (comment)(Left note at bedside) Plan of Care: 
 
 [] Support spiritual and/or cultural needs  
 [] Support AMD and/or advance care planning process    
 [] Support grieving process 
 [] Coordinate Rites and/or Rituals  
 [] Coordination with community clergy [] No spiritual needs identified at this time 
 [] Detailed Plan of Care below (See Comments)  [] Make referral to Music Therapy 
[] Make referral to Pet Therapy    
[] Make referral to Addiction services 
[] Make referral to [facility] 
[] Make referral to Spiritual Care Partner 
[] No future visits requested       
[] Follow up visits as needed Comments: Visited Mr Chaudhry Him in [facility] for initial spiritual assessment.   Kayce Gaitan was lying quietly with eyes closed and on vent support. No family was present. Patient's nurse was at bedside and updated  on POC. Left note at bedside assuring patient and family of ongoing  availability for support. : . Parth Cramer. Tulio Raman; [facility], to contact [phone] [address] [facility] call: [phone]

## 2020-09-07 NOTE — PROGRESS NOTES
Spiritual Care Assessment/Progress Note [address] 
 
 
NAME: Betsy Dunn      MRN: [id number] AGE: 56 y.o. SEX: male Rastafari Affiliation: Other Language: [place] 9/7/2020     Total Time (in minutes): 11 Spiritual Assessment begun in [facility] 4 CORONARY CARE through conversation with: 
  
    []Patient        [x] Family    [] Friend(s) Reason for Consult: Family care Spiritual beliefs: (Please include comment if needed) [x] Identifies with a robert tradition:     
   [] Supported by a robert community:        
   [] Claims no spiritual orientation:       
   [] Seeking spiritual identity:            
   [] Adheres to an individual form of spirituality:       
   [] Not able to assess:                   
 
    
Identified resources for coping:  
   [] Prayer                           
   [] Music                  [] Guided Imagery [x] Family/friends                 [] Pet visits [] Devotional reading                         [] Unknown 
   [] Other:                                          
 
 
Interventions offered during this visit: (See comments for more details) Patient Interventions: Initial/Spiritual assessment, Critical care Family/Friend(s): Affirmation of emotions/emotional suffering, Catharsis/review of pertinent events in supportive environment, Initial Assessment Plan of Care: 
 
 [x] Support spiritual and/or cultural needs  
 [] Support AMD and/or advance care planning process [x] Support grieving process 
 [] Coordinate Rites and/or Rituals  
 [] Coordination with community clergy [] No spiritual needs identified at this time 
 [] Detailed Plan of Care below (See Comments)  [] Make referral to Music Therapy 
[] Make referral to Pet Therapy    
[] Make referral to Addiction services 
[] Make referral to [facility] 
[] Make referral to Spiritual Care Partner 
[] No future visits requested       
[x] Follow up visits as needed Comments: Informed by nurse Alexandria Grant) of Mr Nancy Valle in [facility] that family had made the decision to transition patient to comfort care. Mr Fofana's daughter was at his bedside holding patient's hand when  arrived. Patient was still intubated and focused on his eyes on his daughter; daughter was tearful as she spoke quietly to him. Offered pastoral presence and support as daughter stated that she was doing okay and had no specific needs at that time. Encouraged her to have staff notify  if support desired. : Rev. David Melara. Renée Scott; [facility], to contact [phone] [address] call: [phone]

## 2020-09-07 NOTE — HOSPICE
[facility] Liaison: 
 
Hospice NP, Tim Mcburney, asked this liaison to review patient status with unit RN, and to assess bedside for symptom management needs. Palliative team has been consulted. However, due to holiday, Tim Mcburney, NP, who is covering Hospice services today was contacted by nursing staff, and made aware of palliative consult. Plan to round to patient's unit within the next 30 minutes. Lenny Aragon RN, [facility] Hospice Nurse Liaison [phone] [place] [phone] Office

## 2020-09-07 NOTE — PROGRESS NOTES
2100Tavo Galvan MD notified of pts continued HR in 130's.   
 
2130: Tylenol given, verbal order received for fentanyl 50mcg x1. 
 
2300: Pt HR now NSR in 90's with occasional PVCs.

## 2020-09-07 NOTE — HOSPICE
[address] [facility] 
[phone] Patient Name: Emil Wei YOB: 1960 Age: 56 y.o. [address] [facility] RN Note:  Verbal Hospice consult received from Mariah Sommer NP. Hospice Liaison was already bedside, when this consult was entered. Introduction made to Patient's daughter, Aicha Butterfield, who is currently bedside. Unit nurse, Joann Xiong, RN also bedside, providing support to Leilani Soria. Patient currently is intubated, with some coughing that is requiring acute care interventions. Pt is able to open his eyes for brief amount of time. Daughter is tearful, in anticipation of next steps in plan of care. Support offered to contact family member on her behalf, as well as to notify someone who represents her robert of preference. Leilani Soria declined spiritual support at this time, and states that she is the only family she and her father have. Leilani Soria shared her understanding of having signed a DNR. Leilani Soria has made the request to leave bedside to  her father's best friend who lives [facility], and has been granted permission from the hospital to have this friend be with her at bedside when patient is extubated. Plan at this time: 
Nursing staff to contact hospice liaison when daughter and her fathers friend have returned and are bedside. Will be available to support Leilani Soria and her father this afternoon when she returns. Will follow up with Unit Nurse and Care Manager to discuss plan of care, patient status and discharge disposition within the hour. Thank you for the opportunity to be of service to this patient. Addendum: 17:00 Bedside Visit: Patient daughter returned and is bedside with patient's long-time friend. Friend states that last week, pt was at [facility] for less than 2 days, and was speaking to him at that time. He shares that the change in pt status is very upsetting to him. Support offered.  Hospice information provided to daughter. Pt has [employer], and discharge plan, if patient becomes stable, would be to return to [facility]. Daughter states understanding that Hospice team will follow-up with her on Tuesday, 9/8/2020. Micha Steve RN, [facility] Hospice Nurse Liaison [phone] [place] [phone] Office

## 2020-09-07 NOTE — PROGRESS NOTES
Bedside and Verbal shift change report given to Jose Sen (oncoming nurse) by Leatha Payne (offgoing nurse). Report included the following information SBAR, Kardex, Intake/Output, MAR, Accordion, Recent Results, Med Rec Status, Cardiac Rhythm nsr and Alarm Parameters . 1135: Palliative consult entered, Dr Jeanette Silva office called, no answer, message left without giving PHI.  paged, states Dr Inge Waller is on call, paged for this RN 
1140: Dr Annette Shaffer and Dr Dobbins left message with pt daughter in regards to condition and wishing to touch bases, will await return call or family presence on unit 
1402: Dr Annette Shaffer speaking with pt daughter at bedside regarding condition and family stated plans to compassionately extubate today, pt daughter states she does not want trach/PEG per pt wishes, orders for DNR and extubation received when family is ready. This RN paged palliative MD consulted this morning and pastoral care paged, palliative MD aware of situation, orders for comfort care and hospice received 1435: Hospice RN at bedside speaking with this RN and pt daughter Asher Hassan. Asher Hassan wishes for pts best friend to be present at bedside before extubating, able to have more than one visitor present with end of life care. Asher Hassan will be going to  his fathers best friend shortly 1640: Hospice RN at bedside speaking with pt daughter and pts best friend, opportunity for clarification and questions given in regards to compassionate extubation process and expectations moving forward. Depending on patient status, may move to [address] for hospice care. [phone]: Pt daughter and pts best friend at bedside, ready for compassionate extubation. Done so by this RN at RT present. Will give comfort care medications per order set Bedside and Verbal shift change report given to ICU RN (oncoming nurse) by Maximino Pierce (offgoing nurse).  Report included the following information SBAR, Kardex, Intake/Output, MAR, Accordion, Recent Results, Med Rec Status, Cardiac Rhythm nsr  and Alarm Parameters .

## 2020-09-07 NOTE — PROGRESS NOTES
[facility] 
 
ICU TEAM Progress Note Name: Yi Sauer : 1960 MRN: [id number] Date: 2020 I Subjective:  
Progress Note: 2020 Reason for ICU Admission: Respiratory failure. Presented in septic shock Interval history: 55-year-old gentleman nursing home resident who recently was at [address] after sustaining a large stroke left him with left hemiplegia.  After few weeks of that incident he was transferred out to a nursing home where he was found poorly responsive after receiving pain medication.  Transferred to our ER where we had to be intubated and was found to be hypotensive and septic shock pressors initiated as well as broad-spectrum antibiotic.  Hemodynamically improved and norepinephrine was discontinued afternoon on .  His renal function also improved and back to baseline. Overnight Events:  
No acute event overnight. Tolerating tube feeding. This morning off sedation remained calm. Does not follow commands, he does looks around and looks comfortable. Active Problem List:  
 
Problem List  Never Reviewed Codes Class ARF (acute renal failure) ([facility]) ICD-10-CM: N17.9 ICD-9-CM: 584.9 Leukocytosis ICD-10-CM: Z92.316 ICD-9-CM: 288.60 Acute respiratory failure with hypoxemia ([facility]) ICD-10-CM: J96.01 
ICD-9-CM: 518.81   
   
 * (Principal) Septic shock ([facility]) ICD-10-CM: A41.9, R65.21 ICD-9-CM: 038.9, 785.52, 995.92 Past Medical History:  
 
 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 Past Surgical History:  
 
 has a past surgical history that includes hx orthopaedic and hx appendectomy. Home Medications:  
 
Prior to Admission medications Medication Sig Start Date End Date Taking? Authorizing Provider  
acetaminophen (TYLENOL) 325 mg tablet Take 650 mg by mouth every four (4) hours as needed for Pain.    Yes Provider, Historical  
 albuterol (PROVENTIL VENTOLIN) 2.5 mg /3 mL (0.083 %) nebu 2.5 mg by Nebulization route every six (6) hours as needed for Wheezing. Yes Provider, Historical  
amLODIPine (NORVASC) 10 mg tablet Take 10 mg by mouth daily. Yes Provider, Historical  
atorvastatin (LIPITOR) 40 mg tablet Take 40 mg by mouth nightly. Yes Provider, Historical  
calcium carbonate (OS-JOE) 500 mg calcium (1,250 mg) tablet Take 1 Tab by mouth three (3) times daily. Yes Provider, Historical  
diclofenac (VOLTAREN) 1 % gel Apply 2 g to affected area two (2) times a day. Yes Provider, Historical  
folic acid (FOLVITE) 1 mg tablet Take 1 mg by mouth daily. Yes Provider, Historical  
furosemide (LASIX) 20 mg tablet Take 20 mg by mouth daily. For edema   Yes Provider, Historical  
guaiFENesin-codeine (guaiFENesin AC) 100-10 mg/5 mL solution Take 10 mL by mouth every four (4) hours as needed for Cough. Yes Provider, Historical  
guar gum (BENEFIBER) packet Take 1 Packet by mouth every eight (8) hours as needed for Diarrhea. Yes Provider, Historical  
heparin sodium,porcine (heparin, porcine,) 5,000 unit/mL injection 5,000 Units by SubCUTAneous route every eight (8) hours. Yes Provider, Historical  
albuterol-ipratropium (DUO-NEB) 2.5 mg-0.5 mg/3 ml nebu 3 mL by Nebulization route every six (6) hours as needed for Wheezing or Other (O2 desats). Yes Provider, Historical  
lisinopriL (PRINIVIL, ZESTRIL) 10 mg tablet Take 10 mg by mouth daily. Yes Provider, Historical  
methocarbamoL (ROBAXIN) 500 mg tablet Take 1,000 mg by mouth every six (6) hours. Yes Provider, Historical  
therapeutic multivitamin (THERAGRAN) tablet Take 1 Tab by mouth daily. Yes Provider, Historical  
ondansetron hcl (ZOFRAN) 4 mg tablet Take 4 mg by mouth every six (6) hours as needed for Nausea or Nausea or Vomiting. Yes Provider, Historical  
thiamine HCL (B-1) 100 mg tablet Take 100 mg by mouth daily.    Yes Provider, Historical  
 traMADoL (ULTRAM) 50 mg tablet Take 50 mg by mouth every six (6) hours as needed for Pain. Yes Provider, Historical  
traMADoL (ULTRAM) 50 mg tablet Take 50 mg by mouth every six (6) hours. Yes Provider, Historical  
traZODone (DESYREL) 50 mg tablet Take 50 mg by mouth nightly. Yes Provider, Historical  
 
 
Allergies/Social/Family History: Allergies Allergen Reactions  Hydrocodone Rash and Nausea and Vomiting Social History Tobacco Use  Smoking status: Current Every Day Smoker Packs/day: 1.00 Substance Use Topics  Alcohol use: Yes Comment: 12 pack every 2 days History reviewed. No pertinent family history. Review of Systems:  
 
Not able to obtain as he is sedated intubated Objective:  
Vital Signs: 
Visit Vitals /70 Pulse 83 Temp 100 °F (37.8 °C) Resp 14 Ht 5' 7\" (1.702 m) Wt 78.5 kg (173 lb 1 oz) SpO2 99% BMI 27.11 kg/m² O2 Flow Rate (L/min): 15 l/min O2 Device: Endotracheal tube, Ventilator Temp (24hrs), Av.7 °F (37.1 °C), Min:98.3 °F (36.8 °C), Max:100 °F (37.8 °C) Intake/Output:  
 
Intake/Output Summary (Last 24 hours) at 2020 1126 Last data filed at 2020 1000 Gross per 24 hour Intake 1686.6 ml Output 2440 ml Net -753.4 ml Physical Exam: 
General:  Intubated, well developed, appears stated age Eyes:  Sclera anicteric. Pupils equally round and reactive to light. Mouth/Throat: Mucous membranes normal, ET tube Neck: Supple, central line removed Lungs:   Clear to auscultation bilaterally, good effort CV:  Regular rate and rhythm,no murmur, click, rub or gallop Abdomen:   Soft, non-tender. bowel sounds normal. non-distended Extremities: No cyanosis or edema Skin:  Sacral deep tissue injury, no open skin.  Followed by wound care Lymph nodes: Cervical and supraclavicular normal  
Musculoskeletal: No swelling or deformity Lines/Devices:  Intact, no erythema, drainage or tenderness Psych:  Off sedation on mechanical ventilation, calm, open eyes spontaneously, seems to follow some commands using his right side. Dense hemiplegia on the left side LABS AND  DATA: Personally reviewed Recent Labs  
  09/07/20 0526 09/06/20 
0349 WBC 10.0 11.1 HGB 10.8* 11.0*  
HCT 32.4* 33.7*  
 229 Recent Labs  
  09/07/20 0526 09/06/20 
0349  140  
K 4.1 3.8  108 CO2 26 27 BUN 11 13 CREA 0.75 0.70 * 100 CA 7.9* 8.0*  
MG 2.0 2.1 PHOS 3.6 2.4* Recent Labs  
  09/07/20 0526 09/06/20 0349 AP 57 59  
TP 5.8* 5.6* ALB 1.6* 1.6*  
GLOB 4.2* 4.0 Recent Labs  
  09/07/20 0526 09/06/20 
0349 INR 1.0 1.0 PTP 10.3 10.2 Recent Labs  
  09/05/20 
7835 PHI 7.44  
PCO2I 39.5 PO2I 83 FIO2I 24 No results for input(s): CPK, CKMB, TROIQ, BNPP in the last 72 hours. Hemodynamics:  
PAP:   CO:    
Wedge:   CI:    
CVP:    SVR:    
  PVR:    
 
Ventilator Settings: 
Mode Rate Tidal Volume Pressure FiO2 PEEP Assist control, VC+   480 ml  8 cm H2O 24 % 5 cm H20 Peak airway pressure: 18 cm H2O Minute ventilation: 7.55 l/min MEDS: Reviewed Chest X-Ray: CXR Results  (Last 48 hours) None Assessment: Septic shock:: Resolved  Acute hypoxic hypercapnic respiratory failure: Likely due to pneumonia/aspiration  COVID-19 ruled out  Acute kidney injury:: Resolved  Recent history of ischemic stroke with residual left hemiplegia 
  
 
Plan:  
 
Finishing Zosyn today,  vancomycin was discontinued on 3rd. Culture remain negative except some GPC on sputum Cx, no sensitivities done. Off pressors. 
-Continue lung protective ventilation.  Daughter is going to come today and plan to extubate him understanding that he will not be intubated again. At that time his CODE STATUS will be changed.   She understand that he had significant neurological deficit from the recent stroke and she acknowledged that he does not want to live with a tracheostomy or feeding tube. -Consult palliative care. 
-He may need hospice care. Will discuss with daughter when she comes.  Kidney function back to baseline.  Hypernatremia resolved. Replace electrolytes as indicated  Tolerating tube feeding  
  
DVT and GI prophylaxis.  Ventilator bundle. 
  
 
DISPOSITION Stay in ICU CRITICAL CARE CONSULTANT NOTE I had a face to face encounter with the patient, reviewed and interpreted patient data including clinical events, labs, images, vital signs, I/O's, and examined patient. I have discussed the case and the plan and management of the patient's care with the consulting services, the bedside nurses and the respiratory therapist.   
 
NOTE OF PERSONAL INVOLVEMENT IN CARE This patient has a high probability of imminent, clinically significant deterioration, which requires the highest level of preparedness to intervene urgently. I participated in the decision-making and personally managed or directed the management of the following life and organ supporting interventions that required my frequent assessment to treat or prevent imminent deterioration. I personally spent 40 minutes of critical care time. This is time spent at this critically ill patient's bedside actively involved in patient care as well as the coordination of care and discussions with the patient's family. This does not include any procedural time which has been billed separately. Sudha Bean MD 
[address] 9/7/2020

## 2020-09-08 NOTE — PROGRESS NOTES
Hospice attending note: 
 
Hospice consult reviewed. Patient assessed. He appears to be actively dying with prognosis hours to days. He has tachypnea with increased wob. He is appropriate for inpatient hospice, and we are in process of obtaining consents. Today we have titrated his morphine to 4 mg IV without significant change in symptoms (2 doses in 2 hours and still appears tachypneic with labored breathing this afternoon). Will therefore trial rotation to dilaudid. Thank you for asking our team to participate in the care of Mr. Bhanu Gil. Will plan to complete inpatient hospice admission once daughter is able to return hospice consents.   
 
Loc Sullivan MD

## 2020-09-08 NOTE — CONSULTS
Palliative medicine Note that patient is being admitted to hospice, will cancel palliative consult. Please call if needed.

## 2020-09-08 NOTE — PROGRESS NOTES
Problem: Breathing Pattern - Ineffective Goal: *Absence of hypoxia Outcome: Progressing Towards Goal 
Goal: *Use of effective breathing techniques Outcome: Progressing Towards Goal 
Goal: *PALLIATIVE CARE:  Alleviation of Dyspnea Outcome: Progressing Towards Goal 
  
Problem: Patient Education: Go to Patient Education Activity Goal: Patient/Family Education Outcome: Progressing Towards Goal 
  
Problem: Ventilator Management Goal: *Adequate oxygenation and ventilation Outcome: Progressing Towards Goal 
Goal: *Patient maintains clear airway/free of aspiration Outcome: Progressing Towards Goal 
Goal: *Absence of infection signs and symptoms Outcome: Progressing Towards Goal 
Goal: *Normal spontaneous ventilation Outcome: Progressing Towards Goal 
  
Problem: Patient Education: Go to Patient Education Activity Goal: Patient/Family Education Outcome: Progressing Towards Goal 
  
Problem: Risk for Spread of Infection Goal: Prevent transmission of infectious organism to others Description: Prevent the transmission of infectious organisms to other patients, staff members, and visitors. Outcome: Progressing Towards Goal 
  
Problem: Patient Education:  Go to Education Activity Goal: Patient/Family Education Outcome: Progressing Towards Goal 
  
Problem: Non-Violent Restraints Goal: *Removal from restraints as soon as assessed to be safe Outcome: Progressing Towards Goal 
Goal: *No harm/injury to patient while restraints in use Outcome: Progressing Towards Goal 
Goal: *Patient's dignity will be maintained Outcome: Progressing Towards Goal 
Goal: *Patient Specific Goal (EDIT GOAL, INSERT TEXT) Outcome: Progressing Towards Goal 
Goal: Non-violent Restaints:Standard Interventions Outcome: Progressing Towards Goal 
Goal: Non-violent Restraints:Patient Interventions Outcome: Progressing Towards Goal 
Goal: Patient/Family Education Outcome: Progressing Towards Goal 
  
Problem: Falls - Risk of 
 Goal: *Absence of Falls Description: Document Talha Elianasamuel Fall Risk and appropriate interventions in the flowsheet. Outcome: Progressing Towards Goal 
Note: Fall Risk Interventions: 
Mobility Interventions: Communicate number of staff needed for ambulation/transfer Mentation Interventions: Room close to nurse's station Medication Interventions: Evaluate medications/consider consulting pharmacy Elimination Interventions: Toileting schedule/hourly rounds History of Falls Interventions: Evaluate medications/consider consulting pharmacy Problem: Patient Education: Go to Patient Education Activity Goal: Patient/Family Education Outcome: Progressing Towards Goal 
  
Problem: Pressure Injury - Risk of 
Goal: *Prevention of pressure injury Description: Document Jay Scale and appropriate interventions in the flowsheet. Outcome: Progressing Towards Goal 
  
Problem: Patient Education: Go to Patient Education Activity Goal: Patient/Family Education Outcome: Progressing Towards Goal 
  
Problem: Nutrition Deficit Goal: *Optimize nutritional status Outcome: Progressing Towards Goal 
  
Problem: Breathing Pattern - Ineffective Goal: *Absence of hypoxia Outcome: Progressing Towards Goal 
Goal: *Use of effective breathing techniques Outcome: Progressing Towards Goal 
Goal: *PALLIATIVE CARE:  Alleviation of Dyspnea Outcome: Progressing Towards Goal 
  
Problem: Patient Education: Go to Patient Education Activity Goal: Patient/Family Education Outcome: Progressing Towards Goal 
  
Problem: Breathing Pattern - Ineffective Goal: *Absence of hypoxia Outcome: Progressing Towards Goal 
Goal: *Use of effective breathing techniques Outcome: Progressing Towards Goal 
Goal: *PALLIATIVE CARE:  Alleviation of Dyspnea Outcome: Progressing Towards Goal 
  
Problem: Patient Education: Go to Patient Education Activity Goal: Patient/Family Education Outcome: Progressing Towards Goal 
  
 Problem: Infection - Risk of, Ventilator-Associated Pneumonia Goal: *Absence of infection signs and symptoms Outcome: Progressing Towards Goal 
  
Problem: Patient Education: Go to Patient Education Activity Goal: Patient/Family Education Outcome: Progressing Towards Goal 
  
Problem: Hypertension Goal: *Blood pressure within specified parameters Outcome: Progressing Towards Goal 
Goal: *Fluid volume balance Outcome: Progressing Towards Goal 
Goal: *Labs within defined limits Outcome: Progressing Towards Goal 
  
Problem: Patient Education: Go to Patient Education Activity Goal: Patient/Family Education Outcome: Progressing Towards Goal 
  
Problem: Pain Goal: *Control of Pain Outcome: Progressing Towards Goal 
Goal: *PALLIATIVE CARE:  Alleviation of Pain Outcome: Progressing Towards Goal 
  
Problem: Patient Education: Go to Patient Education Activity Goal: Patient/Family Education Outcome: Progressing Towards Goal 
  
Problem: Non-Violent Restraints Goal: *Removal from restraints as soon as assessed to be safe Outcome: Progressing Towards Goal 
Goal: *No harm/injury to patient while restraints in use Outcome: Progressing Towards Goal 
Goal: *Patient's dignity will be maintained Outcome: Progressing Towards Goal 
Goal: *Patient Specific Goal (EDIT GOAL, INSERT TEXT) Outcome: Progressing Towards Goal 
Goal: Non-violent Restaints:Standard Interventions Outcome: Progressing Towards Goal 
Goal: Non-violent Restraints:Patient Interventions Outcome: Progressing Towards Goal 
Goal: Patient/Family Education Outcome: Progressing Towards Goal 
  
Problem: Breathing Pattern - Ineffective Goal: *Absence of hypoxia Outcome: Progressing Towards Goal 
Goal: *Use of effective breathing techniques Outcome: Progressing Towards Goal 
Goal: *PALLIATIVE CARE:  Alleviation of Dyspnea Outcome: Progressing Towards Goal 
  
Problem: Patient Education: Go to Patient Education Activity Goal: Patient/Family Education Outcome: Progressing Towards Goal

## 2020-09-08 NOTE — PROGRESS NOTES
80  Daughter is taking friend home for the night. Will return. 2100 Daughter calls and cannot return at this time. Update and assurances given. [facility] if any changes occur. 0300 report given to 6E, Pt is going to room 610

## 2020-09-08 NOTE — HOSPICE
Hospice Liaison Nurse: 
 
Pt has been transferred to [address] [place] [address]  Received update from staff. Daughter currently not bedside. Pt respiratory rate is 36, HR 96. Pt brow is furrowed. He is not responding to this nurse. Plan to review comfort care orders with unit nurse, Orestes Velasquez, and review patient with Hospice Doctor, Ramona Prince. Plan to contact patient daughter to offer support and arrange time to admit to Hospice services, as directed by Dr. Diana Munoz. Jessica Parmar RN, [facility] Hospice Nurse Liaison [phone] Mobile [phone] Office Addendum 10:56: 
 
Spoke with patient's daughter, Steffi Blue. Aura plans to come bedside later today, and will call this hospice nurse when she is available to meet with hospice team. 
 
Jessica Parmar RN, [facility] Hospice Nurse Liaison [phone] Mobile [phone] Office

## 2020-09-08 NOTE — PROGRESS NOTES
NUTRITION 
 
RD PLAN OF CARE:  
Best practice alert received for pressure ulcer. Pt already being followed by this service. Chart reviewed and further nutrition assessment/ intervention no longer indicated. Pt being admitted to hospice. Will sign off, but available to assist if needed. Luisa Acosta RD Available via [facility] Pager# [phone]

## 2020-09-08 NOTE — PROGRESS NOTES
SPEECH THERAPY SCREENING: 
SERVICES ARE NOT INDICATED AT THIS TIME [facility] therapy based on results obtained during the nursing admission assessment. The patients chart was reviewed and the patient is not appropriate for a skilled therapy evaluation at this time. Please consult speech therapy if any therapy needs arise. Thank you. Patient is being admitted to hospice.  
 
Danica Chao, SLP

## 2020-09-08 NOTE — PROGRESS NOTES
[facility] Adult  Hospitalist Group ICU Transfer/Accept Summary [place] [address] [place] 
DATE OF TRANSFER: 9/8/2020 PATIENT ID: Dion Busch MRN: [id number] YOB: 1960 PRIMARY CARE PROVIDER: Con Cruz MD  
DATE OF ADMISSION: 8/30/2020  8:06 PM   
ATTENDING PHYSICIAN: Haven Palacios MD 
CONSULTATIONS:  
IP CONSULT TO PALLIATIVE CARE - PROVIDER PROCEDURES/SURGERIES:  
* No surgery found * REASON FOR ADMISSION: Septic shock ([address] HOSPITAL PROBLEM LIST: 
Patient Active Problem List  
Diagnosis Code  Septic shock ([facility]) A41.9, R65.21  
 ARF (acute renal failure) ([facility]) N17.9  Leukocytosis D72.829  
 Acute respiratory failure with hypoxemia ([facility]) J96.01 Brief HPI and Hospital Course:   
 
Inge Joseph is a 18-year-old male seen and examined today by critical care services due to initial complaints of hypotension. Patient has past medical history for eplepsia, Segovia sarcoma, hepatitis C, seizures. When patient arrived to the emergency room he was found to be in life-threatening septic shock and acute hypoxic respiratory failure. Patient was a transfer from [facility] due to hypotension with systolic blood pressure in the 80s and oxygen saturation in the 80s which required endotracheal intubation by emergency room physician due to acute hypoxic respiratory failure. Endotracheal intubation by emergency room physician due to acute hypoxic respiratory failure. Patient was started on IV norepinephrine drip due to life-threatening hypotension and was given IV antibiotic therapies Zosyn/vancomycin for IV antibiotic coverage. WBC is 21.6. Lactic acid 1.8. Platelets 867. Hemoglobin 15.3/hematocrit 46.9. Patient appears to be intravascularly depleted. Patient received 2 L IV fluid crystalloid bolus and was started on normal saline at 75 cc/h.   Patient is nonoliguric with acute renal failure and Rivera catheter was placed. Creatinine 4.98, BUN 91. Will consult nephrology if needed. Cardiac enzymes are negative. Urinalysis negative. Patient was given p.o. Kayexalate via NG tube for potassium 5.5. Right subclavian central line placed with no complications. Patient was transferred from ER to the ICU  for mechanical ventilator and vasopressor support with septic shock. He was initiated on broad spectrum IV antibiotic coverage. He was improved hemodynamically; Norepinephrine was discontinued afternoon on 9/1/2020.   His renal function also improved and returned to baseline. Yesterday after discussion with patient's family patient's code status reportedly was changed to DNR/ [address]. Patient was extubated and on 9/7/2020 and remains on supplemental oxygen via nasal cannula. ICU intensivist tonight request transfer to the floor to the hospitalist service for continued treatments. Assessment: Septic shock:: Resolved  Acute hypoxic hypercapnic respiratory failure: Likely due to pneumonia/aspiration  COVID-19 ruled out  Acute kidney injury:: Resolved  Recent history of ischemic stroke with residual left hemiplegia - Hypernatremia. Resolved - Sacral decubitus ulcer 
  
  
Plan:  
  
Finishing Zosyn completed 9/7/2020,  Vancomycin was discontinued on 9/3/2020. Culture remain negative except some GPC on sputum Cx, no sensitivities done. Off pressors. 
- ICU and palliative care services discussed code status with patient's daughter and it was changed to DNR. Patient was extubated on 9/7/2020 and placed on comfort care measures with plan for hospice care.  Kidney function back to baseline.   
 Tolerating tube feeding  
- continue wound cares 
  
DVT and GI prophylaxis. CODE STATUS:  DNR/ COMFORT CARES 
  
 
 
PHYSICAL EXAMINATION: 
Visit Vitals /68 (BP 1 Location: Right arm, BP Patient Position: At rest) Pulse 98 Temp 98.9 °F (37.2 °C) Resp 25  
 Ht 5' 7\" (1.702 m) Wt 78.5 kg (173 lb 1 oz) SpO2 99% BMI 27.11 kg/m² General:           Ill appearing male in acute respiratory distress HEENT:           NCAT. Pupils 2 mm reactive. Nares patent. Tongue midline/ non-edematous. Neck:               Supple, symmetrical,  thyroid: non tender Lungs:             Rales to auscultation bilaterally. Heart:              Regular rate and rhythm. Abdomen:       Soft, non-tender. No rebound, guarding, or rigidity. Non-distended. Bowel sounds normal 
Extremities:     No cyanosis. No clubbing.  +2 radial/ 1+ DP bilateral pulses Non-pitting bilateral leg edema. Skin:                warm/ dry. Not pale. Not Jaundiced  stage 3 sacral decubitus ulcer Psych:             Obtunded/ unresponsive. Neurologic:      GCS 3. 
Rectal:             Rectal tube in place :                  Rivera catheter in place CODE STATUS: 
   
X DNR  
   
X Comfort Care Signed:  
Alisa Granados MD 
Date of Service:  9/8/2020 
2:54 AM

## 2020-09-09 PROBLEM — J96.00 ACUTE RESPIRATORY FAILURE (HCC): Status: ACTIVE | Noted: 2020-01-01

## 2020-09-09 NOTE — PROGRESS NOTES
Problem: Breathing Pattern - Ineffective Goal: *Absence of hypoxia Outcome: Progressing Towards Goal 
Goal: *Use of effective breathing techniques Outcome: Progressing Towards Goal 
Goal: *PALLIATIVE CARE:  Alleviation of Dyspnea Outcome: Progressing Towards Goal 
  
Problem: Patient Education: Go to Patient Education Activity Goal: Patient/Family Education Outcome: Progressing Towards Goal 
  
Problem: Ventilator Management Goal: *Adequate oxygenation and ventilation Outcome: Progressing Towards Goal 
Goal: *Patient maintains clear airway/free of aspiration Outcome: Progressing Towards Goal 
Goal: *Absence of infection signs and symptoms Outcome: Progressing Towards Goal 
Goal: *Normal spontaneous ventilation Outcome: Progressing Towards Goal 
  
Problem: Patient Education: Go to Patient Education Activity Goal: Patient/Family Education Outcome: Progressing Towards Goal 
  
Problem: Risk for Spread of Infection Goal: Prevent transmission of infectious organism to others Description: Prevent the transmission of infectious organisms to other patients, staff members, and visitors. Outcome: Progressing Towards Goal 
  
Problem: Patient Education:  Go to Education Activity Goal: Patient/Family Education Outcome: Progressing Towards Goal 
  
Problem: Non-Violent Restraints Goal: *Removal from restraints as soon as assessed to be safe Outcome: Progressing Towards Goal 
Goal: *No harm/injury to patient while restraints in use Outcome: Progressing Towards Goal 
Goal: *Patient's dignity will be maintained Outcome: Progressing Towards Goal 
Goal: *Patient Specific Goal (EDIT GOAL, INSERT TEXT) Outcome: Progressing Towards Goal 
Goal: Non-violent Restaints:Standard Interventions Outcome: Progressing Towards Goal 
Goal: Non-violent Restraints:Patient Interventions Outcome: Progressing Towards Goal 
Goal: Patient/Family Education Outcome: Progressing Towards Goal 
  
Problem: Falls - Risk of 
 Goal: *Absence of Falls Description: Document Jeannie Patches Fall Risk and appropriate interventions in the flowsheet. Outcome: Progressing Towards Goal 
Note: Fall Risk Interventions: 
Mobility Interventions: Communicate number of staff needed for ambulation/transfer Mentation Interventions: Room close to nurse's station Medication Interventions: Evaluate medications/consider consulting pharmacy Elimination Interventions: Toilet paper/wipes in reach History of Falls Interventions: Evaluate medications/consider consulting pharmacy Problem: Patient Education: Go to Patient Education Activity Goal: Patient/Family Education Outcome: Progressing Towards Goal 
  
Problem: Pressure Injury - Risk of 
Goal: *Prevention of pressure injury Description: Document Jay Scale and appropriate interventions in the flowsheet. Outcome: Progressing Towards Goal 
  
Problem: Patient Education: Go to Patient Education Activity Goal: Patient/Family Education Outcome: Progressing Towards Goal 
  
Problem: Nutrition Deficit Goal: *Optimize nutritional status Outcome: Progressing Towards Goal 
  
Problem: Breathing Pattern - Ineffective Goal: *Absence of hypoxia Outcome: Progressing Towards Goal 
Goal: *Use of effective breathing techniques Outcome: Progressing Towards Goal 
Goal: *PALLIATIVE CARE:  Alleviation of Dyspnea Outcome: Progressing Towards Goal 
  
Problem: Patient Education: Go to Patient Education Activity Goal: Patient/Family Education Outcome: Progressing Towards Goal 
  
Problem: Breathing Pattern - Ineffective Goal: *Absence of hypoxia Outcome: Progressing Towards Goal 
Goal: *Use of effective breathing techniques Outcome: Progressing Towards Goal 
Goal: *PALLIATIVE CARE:  Alleviation of Dyspnea Outcome: Progressing Towards Goal 
  
Problem: Patient Education: Go to Patient Education Activity Goal: Patient/Family Education Outcome: Progressing Towards Goal 
  
 Problem: Infection - Risk of, Ventilator-Associated Pneumonia Goal: *Absence of infection signs and symptoms Outcome: Progressing Towards Goal 
  
Problem: Patient Education: Go to Patient Education Activity Goal: Patient/Family Education Outcome: Progressing Towards Goal 
  
Problem: Hypertension Goal: *Blood pressure within specified parameters Outcome: Progressing Towards Goal 
Goal: *Fluid volume balance Outcome: Progressing Towards Goal 
Goal: *Labs within defined limits Outcome: Progressing Towards Goal 
  
Problem: Patient Education: Go to Patient Education Activity Goal: Patient/Family Education Outcome: Progressing Towards Goal 
  
Problem: Pain Goal: *Control of Pain Outcome: Progressing Towards Goal 
Goal: *PALLIATIVE CARE:  Alleviation of Pain Outcome: Progressing Towards Goal 
  
Problem: Patient Education: Go to Patient Education Activity Goal: Patient/Family Education Outcome: Progressing Towards Goal 
  
Problem: Non-Violent Restraints Goal: *Removal from restraints as soon as assessed to be safe Outcome: Progressing Towards Goal 
Goal: *No harm/injury to patient while restraints in use Outcome: Progressing Towards Goal 
Goal: *Patient's dignity will be maintained Outcome: Progressing Towards Goal 
Goal: *Patient Specific Goal (EDIT GOAL, INSERT TEXT) Outcome: Progressing Towards Goal 
Goal: Non-violent Restaints:Standard Interventions Outcome: Progressing Towards Goal 
Goal: Non-violent Restraints:Patient Interventions Outcome: Progressing Towards Goal 
Goal: Patient/Family Education Outcome: Progressing Towards Goal 
  
Problem: Breathing Pattern - Ineffective Goal: *Absence of hypoxia Outcome: Progressing Towards Goal 
Goal: *Use of effective breathing techniques Outcome: Progressing Towards Goal 
Goal: *PALLIATIVE CARE:  Alleviation of Dyspnea Outcome: Progressing Towards Goal 
  
Problem: Patient Education: Go to Patient Education Activity Goal: Patient/Family Education Outcome: Progressing Towards Goal

## 2020-09-09 NOTE — HOSPICE PLAN OF CARE
Patient resting in bed, reviewed plan of care with family via telephone. Problem: Falls - Risk of 
Goal: *Absence of Falls Description: Document Brooklynn Ruts Fall Risk and appropriate interventions in the flowsheet. Outcome: Progressing Towards Goal 
Note: Fall Risk Interventions: 
Mobility Interventions: Communicate number of staff needed for ambulation/transfer, Bed/chair exit alarm Mentation Interventions: Adequate sleep, hydration, pain control, Bed/chair exit alarm Medication Interventions: Evaluate medications/consider consulting pharmacy Elimination Interventions: Toileting schedule/hourly rounds History of Falls Interventions: Bed/chair exit alarm, Door open when patient unattended, Room close to nurse's station Problem: Patient Education: Go to Patient Education Activity Goal: Patient/Family Education Outcome: Progressing Towards Goal 
  
Problem: Pressure Injury - Risk of 
Goal: *Prevention of pressure injury Description: Document Jay Scale and appropriate interventions in the flowsheet. Outcome: Progressing Towards Goal 
Note: Pressure Injury Interventions: 
Sensory Interventions: Assess changes in LOC, Float heels, Minimize linen layers, Keep linens dry and wrinkle-free, Check visual cues for pain Moisture Interventions: Apply protective barrier, creams and emollients, Limit adult briefs Activity Interventions: Pressure redistribution bed/mattress(bed type) Mobility Interventions: HOB 30 degrees or less, Float heels Friction and Shear Interventions: Apply protective barrier, creams and emollients, HOB 30 degrees or less, Minimize layers, Lift sheet Problem: Patient Education: Go to Patient Education Activity Goal: Patient/Family Education Outcome: Progressing Towards Goal 
  
Problem: Hospice Orientation Goal: Demonstrate understanding of hospice philosophy, plan of care, and home hospice program 
 Description: The patient/family/caregiver will demonstrate understanding of hospice philosophy, plan of care and the home hospice program as evidenced by participation in meeting the patient's psychosocial, spiritual, medical, and physical needs inclusive of medical supplies/equipment focusing on symptoms. Outcome: Progressing Towards Goal 
  
Problem: Potential for Skin Breakdown Goal: Demonstrate ability to care for skin, monitor areas of breakdown and demonstrate methods to prevent breakdown Description: Patient/family/caregiver will demonstrate ability to care for patient's skin, monitor for areas of breakdown, and demonstrate methods to prevent breakdown during hospice care. Outcome: Progressing Towards Goal 
  
Problem: Risk for Falls Goal: Free of falls during episode of care Description: Patient will be free of falls during episode of care. Outcome: Progressing Towards Goal 
  
Problem: Comfort Deficit Goal: Reduce/control pain Description: Patient will report that pain has been reduced or controlled through verbal and nonverbal means and that measures to promote comfort are effective. Outcome: Progressing Towards Goal 
  
Problem: Pain Goal: Verbalize satisfaction of level of comfort and symptom control Outcome: Progressing Towards Goal 
  
Problem: Anticipatory Grief Goal: Explore reactions to and verbalize acceptance of impending loss Description: Patient/family/caregiver will explore reactions to and verbalize acceptance of impending loss. Outcome: Progressing Towards Goal 
  
Problem: Anxiety/Agitation Goal: Verbalize and demonstrate ability to manage anxiety Description: The patient/family/caregiver will verbalize and demonstrate ability to manage the patient's anxiety throughout hospice care. Outcome: Progressing Towards Goal 
  
Problem: Depression Goal: Verbalize and demonstrate ability to manage depression Description: The patient/family/caregiver will verbalize and demonstrate ability to manage patient's depression throughout hospice care. Outcome: Progressing Towards Goal 
  
Problem: End of Life Process Goal: Demonstrate understanding of end of life processes Description: Patient/caregiver will understand end of life processes.  
Outcome: Progressing Towards Goal

## 2020-09-09 NOTE — [facility] IDG SOCIAL WORKER NOTES
Patient: Guido Gracia Date: 09/09/20 Time: 4:02 PM 
 
[facility]  Notes Farheen Torres Pt is a 57 y/o CM with a hospice diagnosis of acute respirtaory failure. Pt has comorbid sepsis, ARF, hx of seizures, Segovia Sarcoma, Hep. C, ETOH, and CVA. Pt was admitted 8/31/ 2020 from [facility] due tp hypotension and sepsis. LCSW will provide emotional support and supportive counseling for capri Roman in coping with feelings of being overwhelmed due to pts EOL acute respiratory failure. LCSW will continue to assess and monitor pt and family/caregiver needs as daughter Andrea Torres is overwhelmed. Low risk for BR for daughter Aniya Roman. Signed by: Juan Moore

## 2020-09-09 NOTE — PROGRESS NOTES
Brief: 
 
Seen and examined, Appears comfortable Anticipate discharge to Hospice later today, else will leave full PN . Charly Cabello MD 9/9/2020

## 2020-09-09 NOTE — PROGRESS NOTES
Patient resting in bed, reviewed plan of care with family via telephone. Problem: Falls - Risk of 
Goal: *Absence of Falls Description: Document Rakel Berumen Fall Risk and appropriate interventions in the flowsheet. Outcome: Progressing Towards Goal 
Note: Fall Risk Interventions: 
Mobility Interventions: Communicate number of staff needed for ambulation/transfer, Bed/chair exit alarm Mentation Interventions: Adequate sleep, hydration, pain control, Bed/chair exit alarm Medication Interventions: Evaluate medications/consider consulting pharmacy Elimination Interventions: Toileting schedule/hourly rounds History of Falls Interventions: Bed/chair exit alarm, Door open when patient unattended, Room close to nurse's station Problem: Patient Education: Go to Patient Education Activity Goal: Patient/Family Education Outcome: Progressing Towards Goal 
  
Problem: Pressure Injury - Risk of 
Goal: *Prevention of pressure injury Description: Document Jay Scale and appropriate interventions in the flowsheet. Outcome: Progressing Towards Goal 
Note: Pressure Injury Interventions: 
Sensory Interventions: Assess changes in LOC, Float heels, Minimize linen layers, Keep linens dry and wrinkle-free, Check visual cues for pain Moisture Interventions: Apply protective barrier, creams and emollients, Limit adult briefs Activity Interventions: Pressure redistribution bed/mattress(bed type) Mobility Interventions: HOB 30 degrees or less, Float heels Friction and Shear Interventions: Apply protective barrier, creams and emollients, HOB 30 degrees or less, Minimize layers, Lift sheet Problem: Patient Education: Go to Patient Education Activity Goal: Patient/Family Education Outcome: Progressing Towards Goal 
  
Problem: Hospice Orientation Goal: Demonstrate understanding of hospice philosophy, plan of care, and home hospice program 
 Description: The patient/family/caregiver will demonstrate understanding of hospice philosophy, plan of care and the home hospice program as evidenced by participation in meeting the patient's psychosocial, spiritual, medical, and physical needs inclusive of medical supplies/equipment focusing on symptoms. Outcome: Progressing Towards Goal 
  
Problem: Potential for Skin Breakdown Goal: Demonstrate ability to care for skin, monitor areas of breakdown and demonstrate methods to prevent breakdown Description: Patient/family/caregiver will demonstrate ability to care for patient's skin, monitor for areas of breakdown, and demonstrate methods to prevent breakdown during hospice care. Outcome: Progressing Towards Goal 
  
Problem: Risk for Falls Goal: Free of falls during episode of care Description: Patient will be free of falls during episode of care. Outcome: Progressing Towards Goal 
  
Problem: Comfort Deficit Goal: Reduce/control pain Description: Patient will report that pain has been reduced or controlled through verbal and nonverbal means and that measures to promote comfort are effective. Outcome: Progressing Towards Goal 
  
Problem: Pain Goal: Verbalize satisfaction of level of comfort and symptom control Outcome: Progressing Towards Goal 
  
Problem: Anticipatory Grief Goal: Explore reactions to and verbalize acceptance of impending loss Description: Patient/family/caregiver will explore reactions to and verbalize acceptance of impending loss. Outcome: Progressing Towards Goal 
  
Problem: Anxiety/Agitation Goal: Verbalize and demonstrate ability to manage anxiety Description: The patient/family/caregiver will verbalize and demonstrate ability to manage the patient's anxiety throughout hospice care. Outcome: Progressing Towards Goal 
  
Problem: Depression Goal: Verbalize and demonstrate ability to manage depression Description: The patient/family/caregiver will verbalize and demonstrate ability to manage patient's depression throughout hospice care. Outcome: Progressing Towards Goal 
  
Problem: End of Life Process Goal: Demonstrate understanding of end of life processes Description: Patient/caregiver will understand end of life processes.  
Outcome: Progressing Towards Goal

## 2020-09-09 NOTE — DISCHARGE SUMMARY
Discharge Summary PATIENT ID: Evette Mg MRN: [id number] YOB: 1960 DATE OF ADMISSION: 8/30/2020  8:06 PM   
DATE OF DISCHARGE: 9/9/2020 PRIMARY CARE PROVIDER: Mario Castro MD  
 
ATTENDING PHYSICIAN: Amado Sanchez MD  
DISCHARGING PROVIDER: Amado Sanchez MD   
To contact this individual call [phone] and ask the  to page. If unavailable ask to be transferred the Adult Hospitalist Department. CONSULTATIONS: None PROCEDURES/SURGERIES: * No surgery found * [address] COURSE:  
  
Admitted to Hospice inpt Ellie Stolline prior notes:  
 
Subjective:  
Progress Note: 9/6/2020   
  
Reason for ICU Admission: Respiratory failure. Presented in septic shock 
  Interval history: 25-year-old gentleman nursing home resident who recently was at [facility] after sustaining a large stroke left him with left hemiplegia.  After few weeks of that incident he was transferred out to a nursing home where he was found poorly responsive after receiving pain medication.  Transferred to our ER where we had to be intubated and was found to be hypotensive and septic shock pressors initiated as well as broad-spectrum antibiotic.  Hemodynamically improved and norepinephrine was discontinued afternoon on September 1.  His renal function also improved and back to baseline. 
  
Overnight Events:  
No acute event overnight. Tolerating tube feeding. This morning off sedation remained calm. Does not follow commands, he does looks around and looks comfortable. 
  
Active Problem List:  
  
         
Problem List  Never Reviewed  
        Codes Class  
  ARF (acute renal failure) ([facility] ICD-10-CM: N17.9 ICD-9-CM: 515. 9    
     
  Leukocytosis ICD-10-CM: N68.639 ICD-9-CM: 288.60    
     
  Acute respiratory failure with hypoxemia ([facility]) ICD-10-CM: J96.01 
ICD-9-CM: 518.81    
     
  * (Principal) Septic shock ([facility]) ICD-10-CM: A41.9, R65.21 
 ICD-9-CM: 038.9, 785.52, 995.92    
     
   
 
  
Past Medical History:  
  
 has a past medical history of Epilepsia ([facility] Segovia sarcoma ([facility] Hepatitis C, and Seizures ([facility]   
Past Surgical History:  
  
 has a past surgical history that includes hx orthopaedic and hx appendectomy. 
  
 
 
 
 
DISCHARGE DIAGNOSES / PLAN:   
 
1.  as above NOTIFY YOUR PHYSICIAN FOR ANY OF THE FOLLOWING:  
Fever over 101 degrees for 24 hours. Chest pain, shortness of breath, fever, chills, nausea, vomiting, diarrhea, change in mentation, falling, weakness, bleeding. Severe pain or pain not relieved by medications. Or, any other signs or symptoms that you may have questions about. DISPOSITION: 
  Home With: 
 OT  PT  HH  RN  
  
 Long term SNF/Inpatient Rehab Independent/assisted living  
x Hospice Other:  
 
  CHRONIC MEDICAL DIAGNOSES: 
Problem List as of 9/9/2020 Never Reviewed Codes Class Noted - Resolved ARF (acute renal failure) ([facility]) ICD-10-CM: N17.9 ICD-9-CM: 584.9  8/31/2020 - Present Leukocytosis ICD-10-CM: S64.297 ICD-9-CM: 288.60  8/31/2020 - Present Acute respiratory failure with hypoxemia [facility]) ICD-10-CM: J96.01 
ICD-9-CM: 518.81  8/31/2020 - Present * (Principal) Septic shock ([facility] ICD-10-CM: A41.9, R65.21 ICD-9-CM: 038.9, 785.52, 995.92  8/30/2020 - Present Greater than 15  minutes were spent with the patient on counseling and coordination of care Signed:  
Kyle Thrasher MD 
9/9/2020 
10:07 AM

## 2020-09-09 NOTE — HOSPICE
[address] [facility] Good Help to Those in Need 
[phone] Inpatient Nursing Admission Patient Name: Don Ledbetter YOB: 1960 Age: 56 y.o. Date of Hospice Admission: 9/9/2020 Hospice Attending Elected by Patient: Lenka Hilliard MD 
Primary Care Physician: Gregor Alejandre MD 
Admitting RN: Kathya Hernandez RN : Joanna Prather LCSW Level of Care (GIP/Routine/Respite): GIP Facility of Care: [facility] Patient Room: 612/01 HOSPICE SUMMARY  
ER Visits/ Hospitalizations in past year: 1 Hospice Diagnosis: Acute respiratory failure ([address] [J96.00] Onset Date of Hospice Diagnosis: September 2020 Summary of Disease Progression Leading to Hospice Diagnosis: Jesus Wang a 67-year-old male was seen by critical care services due to initial complaints of hypotension.  Patient has past medical history for eplepsia, Segovia sarcoma, hepatitis C, seizures.  When patient arrived to the emergency room he was found to be in life-threatening septic shock and acute hypoxic respiratory failure.  Patient was a transfer from [facility] due to hypotension with systolic blood pressure in the 80s and oxygen saturation in the 80s which required endotracheal intubation by emergency room physician due to acute hypoxic respiratory failure.  Endotracheal intubation by emergency room physician due to acute hypoxic respiratory failure.  Patient was started on IV norepinephrine drip due to life-threatening hypotension and was given IV antibiotic therapies Zosyn/vancomycin for IV antibiotic coverage.   Patient appears to be intravascularly depleted.    Patient is nonoliguric with acute renal failure and Rivera catheter was placed.   Cardiac enzymes are negative.  Urinalysis negative. Patient was transferred from ER to the ICU  for mechanical ventilator and vasopressor support with septic shock. He was initiated on broad spectrum IV antibiotic coverage.   He was improved hemodynamically; Norepinephrine was discontinued afternoon on 9/1/2020.   His renal function also improved and returned to baseline. After discussion with patient's family patient's code status reportedly was changed to DNR/ [address]. Patient was extubated and on 9/7/2020 and remains on supplemental oxygen via nasal cannula. Family has opted for inpatient hospice admission as meaningful recovery is not likely. Co-Morbidities:  
Patient Active Problem List  
Diagnosis Code  Septic shock ([facility]) A41.9, R65.21  
 ARF (acute renal failure) ([facility]) N17.9  Leukocytosis D72.829  
 Acute respiratory failure with hypoxemia ([facility]) J96.01  
 Acute respiratory failure ([facility]) J96.00 Diagnoses RELATED to the terminal prognosis: Septic shock, acute renal failure Other Diagnoses: leukocytosis, stroke Rationale for a prognosis of life expectancy of 6 months or less if the disease follows its normal course (Disease Specific History):  
 
Cailin Ruff is a 56 y.o. who was admitted to [address] [place] [address]. The patient's principle diagnosis of acute respiratory failure has resulted in . Functionally, the patient's Palliative Performance Scale has declined over a period of two weeks and is estimated at 10. Objective information that support this patients limited prognosis includes: 
 
 
Septic shock:: Resolved  Acute hypoxic hypercapnic respiratory failure: Likely due to pneumonia/aspiration  COVID-19 ruled out  Acute kidney injury:: Resolved  Recent history of ischemic stroke with residual left hemiplegia - Hypernatremia. Resolved - Sacral decubitus ulcer EXAM:  XR CHEST PORT 
  
INDICATION:  resp. failure 
  
COMPARISON:  9/1/2020 
  
FINDINGS: A portable AP radiograph of the chest was obtained at 341 hours. ET 
tube is just above the devonte and unchanged. NG tube traverses the mediastinum 
into the upper abdomen.  Right PICC catheter tip overlies SVC right atrial 
 junction. There is increasing airspace disease in the left mid and lower thorax 
there is slight increasing right basilar atelectasis/airspace disease. Elizabet Brandon Heart 
size is stable. . .  
  
IMPRESSION IMPRESSION:  
1. ET tube is just above the devnote. 2. There is increasing airspace disease left mid and lower thorax. There is 
slight increasing right basilar atelectasis/airspace disease which could 
represent edema and/or pneumonia The patient/family chose comfort measures with the support of Hospice. Patient meets for GIP  LOC as evidenced by need for IV intervention, RN monitoring, too unstable for transport Prognosis estimated based on 09/09/20 clinical assessment is:  
[] Few to Many Hours [x] Hours to Days  
[] Few to Many Days  
[] Days to Weeks  
[] Few to Many Weeks  
[] Weeks to Months  
[] Few to Many Months ASSESSMENT Patient self-reports:  []  Yes    [x] No 
 
SYMPTOMS: shortness of breath, labored, agitated, minimal responsiveness, cool to touch SIGNS/PHYSICAL FINDINGS: low grade temp, RR of 22, agitated- restless, touching face KARNOFSKY: 10 
 
FAST for all dementia:  n/a Learning Assessment: 
Patient Is patient willing/able to learn? unable What is the highest level of education completed? Learning preference (written material, demonstration, visual)? Learning barriers (ESOL, [place], poor vision)? Caregiver Is caregiver willing to learn care for patient? yes What is the highest level of education completed? High school Learning preference (written material, demonstration, visual)? written Learning barriers (ESOL, [place], poor vision)? none CLINICAL INFORMATION Wt Readings from Last 3 Encounters:  
09/06/20 78.5 kg (173 lb 1 oz) 12/27/12 77.1 kg (170 lb) Ht Readings from Last 3 Encounters:  
09/03/20 5' 7\" (1.702 m) 12/27/12 5' 7\" (1.702 m) There is no height or weight on file to calculate BMI. There were no vitals taken for this visit.  
 
LAB VALUES 
 No results found for this visit on 09/09/20 (from the past 12 hour(s)). No results found for this visit on 09/09/20 (from the past 6 hour(s)). Lab Results Component Value Date/Time Protein, total 5.8 (L) 09/07/2020 05:26 AM  
 Albumin 1.6 (L) 09/07/2020 05:26 AM  
 
 
Currently this patient has: 
[x] Supplemental O2 [] Peripheral IV  [x] PICC    [] PORT [x] Porter Catheter [] NG Tube   [] PEG Tube [] Ostomy   
[] AICD: Has ICD been deactivated? [] Yes [] No:______ Fecal management system PLAN 1. Admit to GIP level of care for symptom management related to pain,dyspnea, agitation 2. IV dilaudid 2mg every 4 hours with prn available every 30 minutes for severe pain,dyspnea 3. IV ativan 2mg every 4 hours with prn available very 30 minutes for severe anxiety, agitation 4. IV robinul 0.2mg every 4 hours as needed for secretions, turn and reposition patient every 4 hours for comfort and assist with postural drainage 5. IV toradol, tylenol suppository available as needed for fever 6. Monitor patient for seizure activity. 7. Fecal management system and porter per protocol, PICC line managed by RN staff, flush as needed. 8. Music therapy ordered for comfort. Hospice Team Frequency Orders: 
Skilled Nurse -  Daily x 7 days /every other day x 7 days  with 5 PRN visits for symptom control. MSW  1 visit for initial assessment/evaluation for family support and need for volunteer services. Teri Half  1 visit for initial assessment/evaluation for spiritual support. ADVANCE CARE PLANNING (Complete in ACP Flow Sheet) Code Status: DNR Durable DNR: []  Yes  [x]  No 
Code Status Discussed/Confirmed:yes Preference for Other Life Sustaining Treatment Discussed/Confirmed:yes Hospitalization Preference: [address] Planning 8/31/2020 Patient's Healthcare Decision Maker is: Named in scanned ACP document Confirm Advance Directive Yes, on file Gilberto Cabot  Other Non-relative  [phone] Sonja Rutherford  Daughter    [phone]  Service: [] Yes  [x]  No      [] Unknown Appropriate for Pinning Ceremony:  [] Yes     [x] No 
Presybeterian: OTHER  Home: TBD 
 
DISCHARGE PLANNING 1. Discharge Plan: patient appears imminent, will likely pass at [facility]. 2. Patient/Family teaching: reviewed plan of care 3. Response to patient/family teaching: no family at bedside this morning SOCIAL/EMOTIONAL/SPIRITUAL NEEDS Spiritual Issues Identified: none at this time Psych/ Social/ Emotional Issues Identified: offer support to daughter as patient transitions. Caregiver Support: 
[x] Provided information on End of Life Care  
[x] Material Provided: Marybel Reaves From My [facility] or [employer]  
 
CARE COORDINATION Dr. Martha Gomez contacted, discharge to hospice order received Dr. Luann Martinez contacted, agrees to serve as attending provider for hospice and provided verbal certification of terminal illness with life expectancy of 6 months or less. Orders for hospice admission, medications and plan of treatment received. Medication reconciliation completed. MEDS: See medication list below DME: Per hospital 
Supplies: Per hospital 
IDT communication to include MD, SN, SW, CH and support team 
 
ALLERGIES AND MEDICATIONS Allergies: Allergies Allergen Reactions  Hydrocodone Rash and Nausea and Vomiting Current Facility-Administered Medications Medication Dose Route Frequency  ondansetron (ZOFRAN) injection 4 mg  4 mg IntraVENous Q4H PRN  
 LORazepam (ATIVAN) injection 2 mg  2 mg IntraVENous Q15MIN PRN  
 LORazepam (ATIVAN) injection 2 mg  2 mg IntraVENous Q4H  
 ketorolac (TORADOL) injection 30 mg  30 mg IntraVENous Q6H PRN  
 acetaminophen (TYLENOL) suppository 650 mg  650 mg Rectal Q4H PRN  
 glycopyrrolate (ROBINUL) injection 0.2 mg  0.2 mg IntraVENous Q4H PRN  
  bisacodyL (DULCOLAX) suppository 10 mg  10 mg Rectal DAILY PRN  
 HYDROmorphone (PF) (DILAUDID) injection 2 mg  2 mg IntraVENous Q4H  
 HYDROmorphone (PF) (DILAUDID) injection 2 mg  2 mg IntraVENous Q30MIN PRN  
 saline peripheral flush soln 5 mL  5 mL IntraVENous PRN

## 2020-09-09 NOTE — PROGRESS NOTES
Music Therapy Assessment [address] [place] [id number]    
1960  56 y.o.  male Patient Telephone Number: [phone] (home) Faith Affiliation: Other Language: [place] Patient Active Problem List  
 Diagnosis Date Noted  Acute respiratory failure ([address] 09/09/2020  ARF (acute renal failure) ([address] 08/31/2020  Leukocytosis 08/31/2020  Acute respiratory failure with hypoxemia ([address] 08/31/2020  Septic shock ([address] 08/30/2020 Date: 9/9/2020            Total Time (in minutes): 26          [facility] Mental Status:   [x] Alert-at times [  ] Elvia Certain [  ]  Confused  [  ] Minimally responsive  [x] Sleeping-at times Communication Status: [  ] Impaired Speech [  ] Nonverbal -N/A Physical Status:   [  ] Oxygen in use  [  ] Hard of Hearing [  ] Vision Impaired [  ] Ambulatory  [  ] Ambulatory with assistance [  ] Non-ambulatory -N/A Music Preferences, Background: Pt's daughter Asher Hassan said pt likes Classic Rock. Clinical Problem addressed: Support relaxation and comfort for pt/family. Goal(s) met in session: 
Physical/Pain management (Scale of 1-10): Pre-session rating: Pt couldn't report on pain. Post-session rating: Pt couldn't report on pain. [x] Increased relaxation   [  ] Affected breathing patterns [  ] Decreased muscle tension   [  ] Decreased agitation [  ] Affected heart rate    [  ] Increased alertness Emotional/Psychological: 
[  ] Increased self-expression   [  ] Decreased aggressive behavior [  ] Decreased feelings of stress  [  ] Discussed healthy coping skills [  ] Improved mood    [  ] Decreased withdrawn behavior Social: 
[  ] Decreased feelings of isolation/loneliness [  ] Positive social interaction  
[x] Provided support and/or comfort for family/friends Spiritual: 
[  ] Spiritual support    [  ] Expressed peace [  ] Expressed robert    [  ] Discussed beliefs Techniques Utilized (Check all that apply):  
[  ] Procedural support MT [x] Music for relaxation [x] Patient preferred music 
[  ] Nanda analysis  [  ] Song choice  [  ] Music for validation [  ] Entrainment  [  ] Movement to music [  ] Guided visualization [  ] Ashley Lord  [  ] Patient instrument playing [  ] Malva Pears writing [  ] Rachael Gomez along   [  ] Jetdevin Lava  [  ] Sensory stimulation [  ] Active Listening  [  ] Music for spiritual support [  ] Making of CDs as gifts Session Observations:  Referral from Eugenie Motta Hospice RN. Patient (pt) was receiving care when this music therapist (MT) knocked on his door. While MT waited for pt's care to be complete, MT spoke on the phone with the pt's daughter Aminah Pretty. MT briefly explained role, asked Aminah Pretty if she could share the pt's music preferences, and offered her support. Pt's daughter shared the pt's music preferences and sounded tearful as she responded. When MT re-entered pt's room, the pt's eyes were open, though he didn't appear to engage in eye contact with MT. MT introduced self, shared with pt about phone call with his daughter, and shared what he could expect to hear. MT sat at bedside and softly sang and played the [place] on [employer] with guitar at a slow tempo. Pt appeared to increase relaxation in response to the music as evidenced by (AEB) closing his eyes. MT softly sang and played the [employer] to Clarence Orlandocolin and the [facility]. Pt's eyes remained closed for the remainder of the session. Will follow as able. MATTIE AdameBC (Music Therapist-Board Certified) Spiritual Care Department Referral-based service

## 2020-09-09 NOTE — PROGRESS NOTES
NUTRITION 
 
RD PLAN OF CARE:  
Best practice alert received for pressure ulcer (again). Pt currently being cared for by hospice services. Nutrition assessment/intervention is not indicated. Valerie Rodriguez RD Available via [employer] Pager# [phone]

## 2020-09-09 NOTE — HOSPICE
[facility] Good Help to Those in Need 
[phone] Social Work Admission Note Patient Name: Benigno West YOB: 1960 Age: 56 y.o. Date of Visit: 20 Facility of Care: [facility] Patient Room: [facility]/ Hospice Attending: Hortencia Cho MD 
Hospice Diagnosis: Acute respiratory failure ([address] [J96.00] Level of Care:  
 [x]  GIP []  Respite 
 []  Routine NARRATIVE This LCSW spoke with [ts daughter Jay Carvalho re [ts GIP admission. Pt is a 57 y/o CM with a hospice diagnosis of acute respirtaory failure. Pt has comorbid sepsis, ARF, hx of seizures, Segovia Sarcoma, Hep. C, ETOH, and CVA. Pt was admitted 2020 from [facility] due tp hypotension and sepsis. Pt was living at [facility] for 1 1/2 days prior tp admission to [facility]. Pt was at [facility] prior to that tome. Pt is single and has one child; Jay Carvalho. Pt is unresponsive. Daughter appears overwhelmed. Daughter reports working is helping her cope with father's terminality. [address] LCSW will provide emotional support and supportive counseling for daughter Jay Carvalho in coping with pts EOL due to acute respiratory failure. LCSW will continue to assess and monitor pt and family/caregiver needs ADVANCE CARE PLANNING Code Status: dnr Durable DNR: _ Yes  _x No 
Advance Care Planning 2020 Patient's Healthcare Decision Maker is: Named in scanned ACP document Confirm Advance Directive Yes, on file Relationship Status: 
[]  Single    
[]       
[]     
[]  Domestic Partner    
[x]  / 
[]  Common Law 
[]   
[]  Unknown If in a relationship, name of partner/spouse: 
Duration of relationship: 
 
Scientologist: OTHER  Home: unknown Resources Provided: will discuss resources with daughter Social Work Initial Assessment Gender: 
male Race/Ethnicity: (jax all that apply) []  [place] ([facility]) or [place] Native 
[]  [place] 
[]  Black or [place] American 
[]   or  []   or [facility] 
[x]  White 
[]  Unknown 
  
 Service:   
[]  Yes  
[x]  No      
[]  Unknown Appropriate for Pinning Ceremony:  
[]  Yes     
[]  No 
Is patient using VA benefits? []  Yes     
[]  No 
  
Primary Language: English  
[]   Needed 
[]   utilized during visit Ability to express thoughts/needs/feelings 
[]  Expressed thoughts/feelings/needs without difficulty 
[]  Requires extra time and cuing 
[]  Speech limited single words 
[]  Uses only gestures (eye, blinking eye or head movement/pointing) []  Unable to express thoughts/feelings/needs (speech unintelligible or inappropriate) [x]  Unresponsive Notes:  
  
Mental Status: 
[]  Alert-oriented to:   
 []  Person   
 []  Place   
 []  Time 
[]  Comatose-responds to:  
 []   Verbal stimuli  
 []  Tactile stimuli  
 []  Painful stimuli 
[]  Forgetful 
[]  Disoriented/Confused 
[]  Lethargic 
[]  Agitated 
[x]  Other (specify): Unresponsive 
   
Notes:  
  
Patients description of Illness/Current Health Status:   
[x]  Patient unable to discuss Unresponsive 
 
[]  Patient unwilling to discuss 
[]  (Specify) Knowledge/Understanding of Disease Process Patient:  
 []  Demonstrates knowledge/understanding of disease process 
 []  Demonstrates knowledge/understanding of treatment plan 
 []  Demonstrates knowledge/understanding of prognosis []  Demonstrates acceptance of prognosis []  Demonstrates knowledge/understanding of resuscitation status [x]  Other (specify) Unresponsive Caregiver: 
 [x]  Demonstrates knowledge/understanding of disease process [x]  Demonstrates knowledge/understanding of treatment plan 
 [x]  Demonstrates knowledge/understanding of prognosis [x]  Demonstrates acceptance of prognosis [x]  Demonstrates knowledge/understanding of resuscitation status 
 []  Other (specify) Notes:  
  
Patients living arrangement/care setting: Use the PRIOR COLUMN when the PATIENTS current health status necessitated a change in his/her primary residence. Prior Current Response  
           []             []    Patients own home/residence []             []    Home of family member/friend []             []    Boarding home  
           []             []    Assisted living facility/custodial center []             []    Hospital/Acute care facility [x]             []    Skilled nursing facility []             []    Long term care facility/Nursing home  
           []             [x]    Hospice in Patient Primary Caregiver: 
[]  No Primary Caregiver Name of Primary Caregiver: Carla Park Relationship or Primary Caregiver:  
 []  Spouse/Significant other     
 [x]  Natural Child      
 []  Step child     
 []  Sibling 
 []  Parent 
 []  Friend/Neighbor 
 []  Community/Sabianism Volunteer 
 []  Paid help 
 []  Other (specify):___________ Notes:   
  
Family members/Significant others: 
Vignesh Quiroga Relationship: 
Phone Number: Actively involved in care? []  Yes  []  No 
 
Name: 
Relationship: 
Phone Number: Actively involved in care? []  Yes  []  No 
 
Name: 
Relationship: 
Phone Number: Actively involved in care? []  Yes  []  No 
 
Social support systems: (select ONE best description) []  Excellent social support system which includes three or more family members or friends 
[x]  Good social support system which includes two or less members or friends 
[]  [address] support which includes one family member or friend 
[]  Poor social support; no family members or friends; basically ALONE Notes:  
  
Emotional Status: (jax all that apply) Patient Caregiver Response [x]                []    Mood/Affect stable and appropriate    
              []                []    Angry  
              []                [x]    Anxious []                []    Apprehensive []                []    Avoidant  
              []                []    Clinging  
              []                []    Depressed  
              []                []    Distraught  
              []                []    Elated []                []    Euphoric  
              []                []    Fearful  
              []                []    Flat Affect  
              []                []    Helpless []                []    Hostile []                []    Impulsive []                []    Irritable  
              []                []    Labile  
              []                []    Manic  
              []                []    Restlessness []                [x]    Sad  
              []                []    Suspicious []                []    Tearful  
              []                []    Withdrawn Notes:  
 
Coping Skills (strengths/weakness):  
 Patient: Coping Skills (strength/weakness)Unresponsive 
:   
 Family/caregiver (strength/weakness): Daughter is working, appears overwhelmed, using distraction for coping/ multiple stressors including pt has a hx of  ETOH, has been disabled for many years.  
  
[place] of care (jax all that apply):    
[]  No burden evident  
[]  Family must administer medications  
[]  Illness causing financial strain  
[x]  Family/Support feels overwhelmed  
[]  Family/Support sleep disturbed with patients care  
[]  Patients care causes extra physical stress  of death 
[]  Illness causes changes in family lifestyle 
[]  Illness impacting family/support employment 
[]  Family experiencing increased time demands 
[]  Patients behavior endangers family 
[]  Denial of patients illness 
[]  Concern over outcome of illness/fear 
[]  Patients behavior embarrassing to family Notes:  
  
 Risk Factors: (jax all that apply):   
[]  No burden evident  
[x]  Alcohol abuse 
[]  Financial resources inadequate to meet basic needs (food/house/etc) []  Financial resources inadequate to meet health care needs (supplies/equipment/medications) 
[]  Food/nutrition resources inadequate 
[]  Home environment unsafe/inadequate for home care 
[]  Homicidal risk 
[]  Lives alone or without concerned relatives 
[]  Multiple medications/complex schedule 
[]  Physical limitations increase likelihood of falls 
[]  Plan of care/treatments complicated 
[]  Substance use/abuse 
[]  Suicidal risk 
[]  Visual impairment threatens safety/ability to perform self-care 
[]  Other (specify): 
  
Abuse/Neglect (actual/potential risks): 
[x]  No signs of abuse/neglect 
[]  History of abuse/neglect                 []  OUCFIFUO          []  Sexual 
[]  History of domestic violence 
[]  Lacks adequate physical care 
[]  Lacks emotional nurturing/support 
[]  Lacks appropriate stimulation/cognitive experiences 
[]  Left alone inappropriately 
[]  Lacks necessary supervision 
[]  Inadequate or delayed medical care 
[]  Unsafe environment (i.e guns/drug use/history of violence in the home/etc.) []  Bruising or other physical signs of injury present 
[]  Other (specify): 
Notes:  
[]  Refer to child/adult protective services Current Sources of Stress (in Addition to Current Illness):  
[x]  None reported 
[]  Bills/Debt   
[]  Career/Job change   
[]   (short term)   
[]   (long term)   
[]  Death of a child (recent)   
[]  Death of a parent (recent)  
[]  Death of a spouse (recent)  
[]  Employment status changed  
[]  Family discord   
[]  Financial loss/Inadequate inther (specify):come 
[]  Job loss 
[]  Legal issues unresolved 
[]  Lifestyle change 
[]  Marital discord 
[]  Marriage within the last year 
[]  Paperwork (insurance/legal/etc) overwhelming 
[]  Separation/Divorce 
[]  Other (specify): 
Notes:   
Current [facility] [employer] Being Utilized 1. Interventions/Plan of Care 1. Assess social and emotional factors related to coping with end of life issues 2. Community resource planning/referral  
3. Relocation to different care setting if/when symptoms stabilize 4. Discharge Planning 1. Has Medicaid, was at [facility] MSW Assessment Completed by: Deloris Hidalog 09/09/20 Time In:3;00 pm      
Time Out: 4;00 pm

## 2020-09-09 NOTE — H&P
[employer] Good Help to Those in Need 
[phone] Patient Name: Evette Mg YOB: 1960 Date of Provider Hospice Visit: 09/09/20 Level of Care:   [x] General Inpatient (GIP)    [] Routine   [] Respite Current Location of Care: 
[x] [facility] [] [facility] [] [address] [] [facility] [] Hospice [facility], patient referred from: 
[] [facility] [] [facility] [] [address] [] [facility] [] Home [] Other:  
 
Date of [address] Admission: 9/9/2020 Hospice Medical Director at time of admission: Shital Lema MD 
 
Principle Hospice Diagnosis: Acute respiratory failure Diagnoses RELATED to the terminal prognosis:  
Recent CVA Suspected aspiration pneumonia Other Diagnoses: Bisi Mg is a 56y.o. year old who was admitted to [facility]. He was recently admitted to an outside hospital [facility]) after a large stroke and experienced hemiplegia. He was later transferred to a nursing home where he was found to be unresponsive. He was admitted to [facility] ICU on 8/20/2020 for treatment of septic shock requiring intubation and pressors. His picture was thought to be due to aspiration pneumonia. He was able to be weaned from pressors on 9/1, and his kidney function improved. The patient/family chose to pursue comfort measures, and patient was compassionately extubated on 9/7. Since this time he has experienced sxs including generalized pain, labored breathing, and restlessness. His family has chosen to pursue inpatient hospice care for further mgt of acute sxs. At time of my exam on 9/9, patient is unresponsive with PPS 10% and anticipated prognosis of hours to days. Objective information:  
9/3/2020 CXR: 
IMPRESSION:  
1. ET tube is just above the devonte. 2. There is increasing airspace disease left mid and lower thorax. There is 
slight increasing right basilar atelectasis/airspace disease which could 
represent edema and/or pneumonia HOSPICE ASSESSMENT Active Symptoms and Issues: 
-Generalized pain 
-Labored breathing and tachypnea 
-Anxiety/agitation/restlessness 
-Unresponsiveness 
-Hospice care Since midnight and prior to my exam pt has received hydromorphone 1 mg IV every 4 hours routinely and 1 dose prn ativan 2 mg IV for symptoms. Yesterday received 5 doses ativan 2 mg IV. Pt would benefit from [facility] LOC for skilled monitoring of nonverbal signs of symptoms and administration/titration of parenteral medications for sx mgt. PLAN 1. GIP level of care needed for persistent symptoms necessitating frequent skilled nursing assessment and administration of parenteral medications. Needs monitoring for need to titrate sx mgt regimen for optimization of comfort. 2. Pt is at high risk of rapid decline and death due to terminal disease process. 3. For pain and labored breathing only partially responsive to lower doses, will titrate dilaudid to 2 mg IV every 4 hours routinely and prn pain, air hunger. 4. For restlessness, will schedule ativan 2 mg IV every 4 hours routinely and prn anxiety, agitation, restlessness. 5. Will have glycopyrrolate 0.2 mg IV available prn to help prevent formation of new secretions. Recommend recovery positioning for drainage of current secretions. Gentle anterior suction of secretions okay (i.e. suction around lips/teeth). Recommend avoiding deep suction to prevent irritation, pain, bleeding. 6. Prn bisacodyl for constipation. 7. Prn tylenol and/or toradol for fever. 8. Will monitor sxs and prn use for need to schedule/titrate medications further 9. D/c all previous medications which are not contributing to comfort at this time 10.  and SW to support family needs 11. Disposition: anticipate that pt will decline and die in the coming hours-days without stabilizing enough for care in the home setting 12. Hospice plan of care discussed with hospice idt Prognosis estimated based on 09/09/20 clinical assessment is:  
[x] Hours to Days   
[] Days to Weeks   
[] Other: 
 
Communicated plan of care with: Hospice Case Manager; Hospice IDT; Care Team 
 
 GOALS OF CARE Patient/Medical POA stated Goal of Care: optimize patient comfort [x] I have reviewed and/or updated ACP information in the Advance Care Planning Navigator. This information is available in the [address] link in the patient's chart header. Primary Medical Decision Maker:   Primary Decision Maker: Luis Knowles - Daughter - [phone] Supplemental (Other) Decision Maker: Radha Molina - Other Non-relative - [phone] Resuscitation Status: DNR If DNR is there a Durable DNR on file? : [x] Yes [] No (If no, complete Durable DNR) HISTORY History obtained from: chart review, hospice liaisons CHIEF COMPLAINT: generalized pain and labored breathing The patient is:  
[] Verbal 
[] Nonverbal 
[x] Unresponsive HPI/SUBJECTIVE:   
9/9: patient unresponsive. Since midnight and prior to my exam pt has received hydromorphone 1 mg IV every 4 hours routinely and 1 dose prn ativan 2 mg IV for symptoms. On my exam pt shows grimacing, restlessness, labored breathing. Daughter has signed hospice consents via [email or web address]. REVIEW OF SYSTEMS The following systems were: [] reviewed  [x] unable to be reviewed as pt is unresponsive Positive ROS include bold items: 
Constitutional: fatigue, weakness, in pain, short of breath Ears/nose/mouth/throat: increased airway secretions Respiratory:shortness of breath, wheezing Gastrointestinal:poor appetite, nausea, vomiting, abdominal pain, constipation, diarrhea Musculoskeletal:pain, deformities, swelling legs Neurologic:confusion, hallucinations, weakness Psychiatric:anxiety, feeling depressed, poor sleep Endocrine: increased thirst, increased hunger Adult Non-Verbal Pain Assessment Score: 3 Face [] 0   No particular expression or smile 
[x] 1   Occasional grimace, tearing, frowning, wrinkled forehead 
[] 2   Frequent grimace, tearing, frowning, wrinkled forehead Activity (movement) [] 0   Lying quietly, normal position 
[x] 1   Seeking attention through movement or slow, cautious movement 
[] 2   Restless, excessive activity and/or withdrawal reflexes Guarding 
[x] 0   Lying quietly, no positioning of hands over areas of body 
[] 1   Splinting areas of the body, tense 
[] 2   Rigid, stiff Physiology (vital signs) [x] 0   Stable vital signs [] 1   Change in any of the following: SBP > 20mm Hg; HR > 20/minute 
[] 2   Change in any of the following: SBP > 30mm Hg; HR > 25/minute Respiratory 
[] 0   Baseline RR/SpO2, compliant with ventilator 
[x] 1   RR > 10 above baseline, or 5% drop SpO2, mild asynchrony with ventilator 
[] 2   RR > 20 above baseline, or 10% drop SpO2, asynchrony with ventilator FUNCTIONAL ASSESSMENT Palliative Performance Scale (PPS): 10 PSYCHOSOCIAL/SPIRITUAL ASSESSMENT Active Problems: 
  Acute respiratory failure ([facility] (9/9/2020) Past Medical History:  
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   
 [facility] Past Surgical History:  
Procedure Laterality Date  HX APPENDECTOMY  HX ORTHOPAEDIC Social History Tobacco Use  Smoking status: Current Every Day Smoker Packs/day: 1.00 Substance Use Topics  Alcohol use: Yes Comment: 12 pack every 2 days No family history on file. Allergies Allergen Reactions  Hydrocodone Rash and Nausea and Vomiting Current Facility-Administered Medications Medication Dose Route Frequency  ondansetron (ZOFRAN) injection 4 mg  4 mg IntraVENous Q4H PRN  
 LORazepam (ATIVAN) injection 2 mg  2 mg IntraVENous Q15MIN PRN  
 LORazepam (ATIVAN) injection 2 mg  2 mg IntraVENous Q4H  
  ketorolac (TORADOL) injection 30 mg  30 mg IntraVENous Q6H PRN  
 acetaminophen (TYLENOL) suppository 650 mg  650 mg Rectal Q4H PRN  
 glycopyrrolate (ROBINUL) injection 0.2 mg  0.2 mg IntraVENous Q4H PRN  
 bisacodyL (DULCOLAX) suppository 10 mg  10 mg Rectal DAILY PRN  
 HYDROmorphone (PF) (DILAUDID) injection 2 mg  2 mg IntraVENous Q4H  
 HYDROmorphone (PF) (DILAUDID) injection 2 mg  2 mg IntraVENous Q30MIN PRN  
 saline peripheral flush soln 5 mL  5 mL IntraVENous PRN PHYSICAL EXAM  
 
Wt Readings from Last 3 Encounters:  
09/06/20 78.5 kg (173 lb 1 oz) 12/27/12 77.1 kg (170 lb) There were no vitals taken for this visit. Supplemental O2  [] Yes  [] NO Last bowel movement:  
 
Currently this patient has: 
[x] Peripheral IV [] PICC  [] PORT [] ICD [] Rivera Catheter [] NG Tube   [] PEG Tube   
[] Rectal Tube [] Drain 
[] Other:  
 
Constitutional: lying abed, not awake, appears ill and frail, +grimace Eyes: lids normal, no drainage ENMT: dry mm, no exudate, nares normal 
Cardiovascular: tachycardia, peripheral pulses palpable, no LE edema Respiratory: RR 20s with labored breathing, no significant secretions Gastrointestinal: hypoactive BS, soft, NT 
Musculoskeletal: no gross deformity or TTP Skin: warm, dry, no mottling Neurologic: does not wake to exam, +spontaneous movements of hands Psychiatric: unresponsive, mildly restless at this time Pertinent Lab and or Imaging Tests: 
Lab Results Component Value Date/Time Sodium 137 09/07/2020 05:26 AM  
 Potassium 4.1 09/07/2020 05:26 AM  
 Chloride 105 09/07/2020 05:26 AM  
 CO2 26 09/07/2020 05:26 AM  
 Anion gap 6 09/07/2020 05:26 AM  
 Glucose 108 (H) 09/07/2020 05:26 AM  
 BUN 11 09/07/2020 05:26 AM  
 Creatinine 0.75 09/07/2020 05:26 AM  
 BUN/Creatinine ratio 15 09/07/2020 05:26 AM  
 GFR est AA >60 09/07/2020 05:26 AM  
 GFR est non-AA >60 09/07/2020 05:26 AM  
 Calcium 7.9 (L) 09/07/2020 05:26 AM  
 
Lab Results Component Value Date/Time Protein, total 5.8 (L) 09/07/2020 05:26 AM  
 Albumin 1.6 (L) 09/07/2020 05:26 AM  
 
   
 
Total time:  
Counseling / coordination time:  
> 50% counseling / coordination?:  
 
Jamarcus Posey MD 
[facility]

## 2020-09-10 NOTE — DISCHARGE SUMMARY
Discharge Summary [facility] Good Help to Those in Need 
[phone] Date of Admission: 9/9/2020 Date of Discharge: 9/10/2020 Trinity Pierre is a 56y.o. year old who was admitted to [facility] at [facility] with a Hospice diagnosis of Acute respiratory failure ([address] [J96.00]. Pt was admitted for [facility] level care. Per HPI: \"Choco Marquez is a 56y.o. year old who was admitted to [facility]. He was recently admitted to an outside hospital [facility]) after a large stroke and experienced hemiplegia. He was later transferred to a nursing home where he was found to be unresponsive. He was admitted to [facility] ICU on 8/20/2020 for treatment of septic shock requiring intubation and pressors. His picture was thought to be due to aspiration pneumonia. He was able to be weaned from pressors on 9/1, and his kidney function improved. The patient/family chose to pursue comfort measures, and patient was compassionately extubated on 9/7. Since this time he has experienced sxs including generalized pain, labored breathing, and restlessness. His family has chosen to pursue inpatient hospice care for further mgt of acute sxs. At time of my exam on 9/9, patient is unresponsive with PPS 10% and anticipated prognosis of hours to days. \" The patient's care was focused on comfort, and the patient passed away on 9/10/2020.  
 
Jessica Redding MD

## 2020-09-10 NOTE — PROGRESS NOTES
requested. Patient death under Hospice care. Staff contacted patient's daughter, Julia Groves, who is on her way to the hospital.  Asked staff to contact the  should daughter arrive and desire pastoral care. WIll continue to follow up as needed and upon request as able. Visited by Rev. Cristina Rock MDiv, [facility], [phone]  paging service: [phone])

## 2020-09-10 NOTE — PROGRESS NOTES
Called to examine patient who has . ? No response to verbal and tactile stimuli. ? No respiratory effort. ? Absent heart sounds and pulses. ? Pupils fixed and dilated. Death pronounced at [phone] on 09/10/20 Patient's family at the bedside. Hospice has been notified per unit staff Unit personnel will contact PCP:   MD Luis Eduardo Brooks MSN, RN, NP-C 
[phone] or via [employer]

## 2020-09-10 NOTE — HOSPICE
[facility] Good Help to Those in Need 
[phone] Discharge/Death Nursing Note Patient Name: Cailin Ruff YOB: 1960 Age: 56 y.o. Date of Death: 09/10/20 Admitted Date: 2020 Time of Death: [facility] Facility of Care: [facility] Level of Care: [facility] Patient Room: [facility]/ Hospice Attending: Ramona Prince MD 
Hospice Diagnosis: Acute respiratory failure ([address] [J96.00] Death Pronouncement Pronouncement of death completed by: Timo Cristobal NP Agency staff (WAS NOT) present at the time of death At the time of death the patient was documented as 
Per NP note: · No response to verbal and tactile stimuli. · No respiratory effort. · Absent heart sounds and pulses. · Pupils fixed and dilated. The pt  within [facility] The following were notified of the patient's death: Family at bedside, , nursing supervisor, and attending MD 
 
Medications were disposed of per facility protocol Discharge Summary Discharge Reason: Death Summary of Care Provided: 
 
[x] Post mortem care provided by unit staff [x] Notification of  home by nursing supervisor 
[] Referrals/Community resources provided:  
[] Goals completed 
[] Durable Medical Equipment vendor notified Disciplines involved: [x] RN [x] SW [x]  [] FALCON [] Vol [] PT [] OT [] ST [] BC 
 
[x] IDT communication/notification Attending Physician, Dr. Diana Munoz, notified of death Bereaved Verify bereaved identified with name, address, telephone number and risk level Advance Care Planning 2020 Patient's Healthcare Decision Maker is: - Confirm Advance Directive Yes, on file

## 2020-09-10 NOTE — PROGRESS NOTES
Problem: Falls - Risk of 
Goal: *Absence of Falls Description: Document Maribell Alcazar Fall Risk and appropriate interventions in the flowsheet. Outcome: Progressing Towards Goal 
Note: Fall Risk Interventions: 
Mobility Interventions: Communicate number of staff needed for ambulation/transfer Mentation Interventions: Adequate sleep, hydration, pain control, Bed/chair exit alarm, Door open when patient unattended Medication Interventions: Evaluate medications/consider consulting pharmacy Elimination Interventions: Toileting schedule/hourly rounds History of Falls Interventions: Bed/chair exit alarm, Door open when patient unattended Problem: Patient Education: Go to Patient Education Activity Goal: Patient/Family Education Outcome: Progressing Towards Goal 
  
Problem: Pressure Injury - Risk of 
Goal: *Prevention of pressure injury Description: Document Jay Scale and appropriate interventions in the flowsheet. Outcome: Progressing Towards Goal 
Note: Pressure Injury Interventions: 
Sensory Interventions: Assess changes in LOC Moisture Interventions: Absorbent underpads, Apply protective barrier, creams and emollients, Internal/External urinary devices Activity Interventions: Pressure redistribution bed/mattress(bed type) Mobility Interventions: Float heels, HOB 30 degrees or less, Pressure redistribution bed/mattress (bed type) Nutrition Interventions: Document food/fluid/supplement intake Friction and Shear Interventions: Apply protective barrier, creams and emollients, Lift sheet Problem: Patient Education: Go to Patient Education Activity Goal: Patient/Family Education Outcome: Progressing Towards Goal

## 2020-09-10 NOTE — HOSPICE
[facility] 
 
This LCSW called pts daughter Petros Olson to offer condolences and support. LCSW offered Gloria  information for ongoing support. LCSW will write a letter for her work to access assistance with burial funds.

## 2020-09-10 NOTE — HOSPICE
[facility] Good Help to Those in Need 
[phone] 
  
Patient Name: Rakel Haney YOB: 1960 Age: 56 y.o A call was received from [address] at [facility] to report the patient's passing. Patient is waiting to be pronounced. Family is being notified by [facility] staff. Hospice staff and attending notified via email. Thank you, Charly Laird RN, BSN [address] Office: [phone] Cell: [phone] Fax: [phone] [email or web address]

## 2020-09-10 NOTE — PROGRESS NOTES
Problem: Infection - Risk of, Urinary Catheter-Associated Urinary Tract Infection Goal: *Absence of infection signs and symptoms Outcome: Progressing Towards Goal

## 2023-05-30 NOTE — [facility]C IDG NURSE NOTES
Mariano Murrell a 55-year-old male was seen by critical care services due to initial complaints of hypotension.  Patient has past medical history for eplepsia, Segovia sarcoma, hepatitis C, seizures.  When patient arrived to the emergency room he was found to be in life-threatening septic shock and acute hypoxic respiratory failure.  Patient was a transfer from [facility] due to hypotension with systolic blood pressure in the 80s and oxygen saturation in the 80s which required endotracheal intubation by emergency room physician due to acute hypoxic respiratory failure.  Endotracheal intubation by emergency room physician due to acute hypoxic respiratory failure.  Patient was started on IV norepinephrine drip due to life-threatening hypotension and was given IV antibiotic therapies Zosyn/vancomycin for IV antibiotic coverage.   Patient appears to be intravascularly depleted.    Patient is nonoliguric with acute renal failure and Rivera catheter was placed.   Cardiac enzymes are negative.  Urinalysis negative. Patient was transferred from ER to the ICU  for mechanical ventilator and vasopressor support with septic shock.  He was initiated on broad spectrum IV antibiotic coverage.  He was improved hemodynamically; Norepinephrine was discontinued afternoon on 9/1/2020.   His renal function also improved and returned to baseline. After discussion with patient's family patient's code status reportedly was changed to DNR/ [address] was extubated and on 9/7/2020 and remains on supplemental oxygen via nasal cannula. Family has opted for inpatient hospice admission as meaningful recovery is not likely. 1. Admit to GIP level of care for symptom management related to pain,dyspnea, agitation 2. IV dilaudid 2mg every 4 hours with prn available every 30 minutes for severe pain,dyspnea 3. IV ativan 2mg every 4 hours with prn available very 30 minutes for severe anxiety, agitation 4. IV robinul 0.2mg every 4 hours as needed for secretions, turn and reposition patient every 4 hours for comfort and assist with postural drainage 5. IV toradol, tylenol suppository available as needed for fever 6. Monitor patient for seizure activity. 7. Fecal management system and porter per protocol, PICC line managed by RN staff, flush as needed. 8. Music therapy ordered for comfort. Episcopal: OTHER  Home: [facility] 
 Billing Type: Third-Party Bill